# Patient Record
Sex: FEMALE | Race: WHITE | URBAN - METROPOLITAN AREA
[De-identification: names, ages, dates, MRNs, and addresses within clinical notes are randomized per-mention and may not be internally consistent; named-entity substitution may affect disease eponyms.]

---

## 2023-09-25 NOTE — PROGRESS NOTES
Assessment/Plan:  Problem List Items Addressed This Visit        Respiratory    Exercise-induced asthma     Stable. Patient will need pre and post spirometry in the future when COVID restrictions are lifted. AR (allergic rhinitis)     Stable on Allegra. Other    Overweight     Recommend lifestyle modifications. Relevant Orders    TSH, 3rd generation with Free T4 reflex    Dermoid cyst     Pending Gyn consult. Fibroids     Pending Gyn consult. Other Visit Diagnoses     Annual physical exam    -  Primary    Health Maintenance   Topic Date Due   • Hepatitis C Screening  Never done   • COVID-19 Vaccine (1) Never done   • HIV Screening  Never done   • DTaP,Tdap,and Td Vaccines (1 - Tdap) Never done   • Cervical Cancer Screening  Never done   • Breast Cancer Screening: Mammogram  Never done   • Influenza Vaccine (1) 09/01/2023   • Colorectal Cancer Screening  09/26/2024 (Originally 10/2/2012)   • Pneumococcal Vaccine: Pediatrics (0 to 5 Years) and At-Risk Patients (6 to 59 Years) (1 - PCV) 09/26/2024 (Originally 10/2/1971)   • Depression Screening  09/26/2024   • BMI: Followup Plan  09/26/2024   • BMI: Adult  09/26/2024   • Annual Physical  09/26/2024   • HIB Vaccine  Aged Out   • IPV Vaccine  Aged Out   • Hepatitis A Vaccine  Aged Out   • Meningococcal ACWY Vaccine  Aged Out   • HPV Vaccine  Aged Out         Dermatitis        Relevant Medications    doxycycline hyclate (VIBRA-TABS) 100 mg tablet    Other Relevant Orders    Lyme Total AB W Reflex to IGM/IGG    TICK BORNE DISEASE, ACUTE MOLECULAR PANEL    Tick-borne Disease, Acute Molecular Panel, Non-Lyme    Pending labs. Suspect Lyme disease. Start doxycycline 100 mg twice daily x10 days. Family hx of colon cancer        Patient declines colonoscopy and desires Cologuard though this is contraindicated.       Breast cancer screening by mammogram        Relevant Orders    Mammo screening bilateral w 3d & cad    Cervical cancer screening        Relevant Orders    Ambulatory Referral to Gynecology    Family history of colon cancer        Relevant Orders    Ambulatory Referral to Genetics    Patient declines colonoscopy and desires Cologuard though this is contraindicated. Family history of prostate cancer        Relevant Orders    Ambulatory Referral to Genetics    Family history of pancreatic cancer        Relevant Orders    Ambulatory Referral to Genetics    Subcutaneous nodule of left foot        Relevant Orders    Ambulatory Referral to Podiatry    Screening for diabetes mellitus        Relevant Orders    Hemoglobin A1C    Screening for cardiovascular condition        Relevant Orders    CBC and differential    Comprehensive metabolic panel    Lipid panel    LDL cholesterol, direct    Screening for HIV (human immunodeficiency virus)        Relevant Orders    HIV 1/2 AB/AG w Reflex [facility] for 2 yr old and above    Need for hepatitis C screening test        Relevant Orders    Hepatitis C antibody    BMI 27.0-27.9,adult        Insect bite of left forearm, initial encounter        Relevant Medications    doxycycline hyclate (VIBRA-TABS) 100 mg tablet    Other Relevant Orders    Lyme Total AB W Reflex to IGM/IGG    TICK BORNE DISEASE, ACUTE MOLECULAR PANEL    Tick-borne Disease, Acute Molecular Panel, Non-Lyme    Myalgia        Relevant Orders    Vitamin D 25 hydroxy           Return in about 6 weeks (around 11/7/2023) for F/U Asthma, Labs; Cancel appt bruce Julio 11/7/23. Future Appointments   Date Time Provider [address] [place] [address]   11/7/2023 10:40 AM Bry Arteaga DO [facility]        Subjective:     Laverne Young is a 56 y.o. female who presents today as a new patient for her medical conditions.       New Patient    Previous PCP:  [facility] in [place] in [place]   Reason for Transfer:  Patient moved  Last seen by previous PCP:  2013  Last Labs:  Never  Last Physical:  Unknown  Medical Records Requested: No      HPI:  Chief Complaint   Patient presents with   • Rash     Pt states that 3-4 weeks ago she noticed what she thought was a spider bite on the inside of the R elbow. Bite had two small marks and developed bumpy rash surrounding the bite. Bite resolved after three weeks. Then received another bite on the R forearm, same appearance as previous bite. Rash then began radiated up the arm and is now on b/l arms, neck, and chest. Rash is itchy and has some other itchy spots on the body, but there is no rash present. Has tried antihistamines with no relief. • New Patient Visit     Here to establish care and to address bites/rash. • Tick Bite     Pt states she is very outdoorsy and has had many tick bites over the years. Reports a tick bite on the L forearm at the same time as the first "spider bite." Bite resolved within a week. • HM     No prior HIV or Hep C screen. Ref for GI pending for CRC due to family hx of colon ca, pt would like to defer at this time, but discuss cologuard. Mammo pending. No GYN at this time, ref pending. Tdap was recently done, but pt does not remember where or when. Has had three doses of covid vaccine but does not have her card with her today. No prior shingrix, would like to discuss vaccine. -- Above per clinical staff and reviewed. --      HPI      Today:      Controlled Substance Review    [place] PDMP or [place]  reviewed: No red flags were identified; safe to proceed with prescription. .      Insect Bite on Medial R Elbow - Occurred 5-6 weeks ago, patient suspects spider bite as she had 2 small bite marks with a crater, bluish black blood in pit, and surrounding bumpy rash, which resolved after 3 weeks. 3-4  Weeks later, she sustained a 2nd insect bite on R forearm c similar appearance as first.  Rash has spread from Right forearm cephalad, to B/L arms, neck, and chest.  Rash is not present, but skin is itchy. Anterior neck rash is painful and itchy.   Using OTC Allergra 180mg QD s relief, OTC moisturizer on neck c benefit. No sick contacts. She spends a lot of tie outdoors, has been bitten by multiple ticks - had tick bite on L forearm 3-4 weeks ago at time of possible spider bite. She has never been tested for Lyme Disease, but may have had symptoms of fatigue. Denies ECM lesion. Overweight - Watching diet. +Exercise - Hiking, biking, or gardening, yard work for 5-6 hours, 7 days per week. AR - Stable on Allergra. Exercise induced Asthma - Not currently using Albuterol HFA. Dermoid Cyst / Fibroid Cysts - Had serial transvaginal U/S -overdue. H/O Pre-Skin Cancer - Management per Ari Toscano at Dr. Meghna Yañez office at [facility] in [facility], [address] Next appt 2/23. Reviewed:  Labs - None    No Gyn. Last Pap 2018. Denies H/O Abnormal Paps. No CRC previously. Sees Dentist q6 months. Sees Optho q1 year. Thinks she had Tdap 2021 - unsure of location. PHQ-2/9 Depression Screening    Little interest or pleasure in doing things: 0 - not at all  Feeling down, depressed, or hopeless: 0 - not at all  PHQ-2 Score: 0  PHQ-2 Interpretation: Negative depression screen           The following portions of the patient's history were reviewed and updated as appropriate: allergies, current medications, past family history, past medical history, past social history, past surgical history and problem list.      Review of Systems   Constitutional: Negative for appetite change, chills, diaphoresis, fatigue and fever. Respiratory: Negative for cough, chest tightness, shortness of breath and wheezing. Cardiovascular: Negative for chest pain. Gastrointestinal: Negative for abdominal pain, blood in stool, diarrhea, nausea and vomiting. Genitourinary: Negative for dysuria. Musculoskeletal: Positive for arthralgias (Chronic). Negative for myalgias. Skin: Positive for rash.         Current Outpatient Medications   Medication Sig Dispense Refill   • doxycycline hyclate (VIBRA-TABS) 100 mg tablet Take 1 tablet (100 mg total) by mouth 2 (two) times a day for 10 days 20 tablet 0   • fexofenadine (ALLEGRA) 180 MG tablet Take 180 mg by mouth daily       No current facility-administered medications for this visit. Objective:  /72   Pulse 56   Temp (!) 97.2 °F (36.2 °C)   Resp 14   Ht 5' 5" (1.651 m)   Wt 74.4 kg (164 lb)   LMP 01/01/2017 (Approximate)   SpO2 99%   Breastfeeding No   BMI 27.29 kg/m²    Wt Readings from Last 3 Encounters:   09/26/23 74.4 kg (164 lb)      BP Readings from Last 3 Encounters:   09/26/23 124/72          Physical Exam  Vitals and nursing note reviewed. Constitutional:       Appearance: Normal appearance. She is well-developed. HENT:      Head: Normocephalic and atraumatic. Right Ear: Tympanic membrane, ear canal and external ear normal.      Left Ear: Tympanic membrane, ear canal and external ear normal.      Nose: Nose normal.      Right Sinus: No maxillary sinus tenderness or frontal sinus tenderness. Left Sinus: No maxillary sinus tenderness or frontal sinus tenderness. Mouth/Throat:      Mouth: Mucous membranes are moist.      Pharynx: Oropharynx is clear. Uvula midline. Tonsils: No tonsillar exudate. Eyes:      Extraocular Movements: Extraocular movements intact. Conjunctiva/sclera: Conjunctivae normal.      Pupils: Pupils are equal, round, and reactive to light. Neck:      Vascular: No carotid bruit. Cardiovascular:      Rate and Rhythm: Normal rate and regular rhythm. Pulses: Normal pulses. Heart sounds: Normal heart sounds. Pulmonary:      Effort: Pulmonary effort is normal.      Breath sounds: Normal breath sounds. Abdominal:      General: Bowel sounds are normal. There is no distension. Palpations: Abdomen is soft. There is no mass. Tenderness: There is no abdominal tenderness. There is no guarding or rebound.    Musculoskeletal: General: No swelling or tenderness. Cervical back: Neck supple. Right lower leg: No edema. Left lower leg: No edema. Feet:      Comments: Left plantar foot longitudinal arch c 1cm x 1cm nontender nodule  Lymphadenopathy:      Cervical: No cervical adenopathy. Skin:     Findings: Rash (Anterior neck c 7cm x 3cm red, rough patch, Right anterior chest c fine, sandpaper rash, B/L UE c nonternder pink macule, Right forearm c  3mm x 3mm area of ecchymosis) present. Neurological:      General: No focal deficit present. Mental Status: She is alert and oriented to person, place, and time. Psychiatric:         Mood and Affect: Mood normal.         Behavior: Behavior normal.         Thought Content: Thought content normal.         Judgment: Judgment normal.         Lab Results:      No results found for: "WBC", "HGB", "HCT", "PLT", "CHOL", "TRIG", "HDL", "LDLDIRECT", "ALT", "AST", "NA", "K", "CL", "CREATININE", "BUN", "CO2", "TSH", "PSA", "INR", "GLUF", "HGBA1C", "[place]"  No results found for: "URICACID"  Invalid input(s): "BASENAME" Vitamin D    No results found. POCT Labs      BMI Counseling: Body mass index is 27.29 kg/m². The BMI is above normal. Nutrition recommendations include encouraging healthy choices of fruits and vegetables. Exercise recommendations include exercising 3-5 times per week. Rationale for BMI follow-up plan is due to patient being overweight or obese. Depression Screening and Follow-up Plan: Patient was screened for depression during today's encounter. They screened negative with a PHQ-2 score of 0.

## 2023-09-26 ENCOUNTER — OFFICE VISIT (OUTPATIENT)
Dept: FAMILY MEDICINE CLINIC | Facility: CLINIC | Age: 58
End: 2023-09-26
Payer: COMMERCIAL

## 2023-09-26 VITALS
BODY MASS INDEX: 27.32 KG/M2 | HEIGHT: 65 IN | SYSTOLIC BLOOD PRESSURE: 124 MMHG | WEIGHT: 164 LBS | OXYGEN SATURATION: 99 % | TEMPERATURE: 97.2 F | RESPIRATION RATE: 14 BRPM | DIASTOLIC BLOOD PRESSURE: 72 MMHG | HEART RATE: 56 BPM
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
## 2023-09-26 DIAGNOSIS — R22.42 SUBCUTANEOUS NODULE OF LEFT FOOT: ICD-10-CM

## 2023-09-26 DIAGNOSIS — Z80.0 FAMILY HISTORY OF PANCREATIC CANCER: ICD-10-CM

## 2023-09-26 DIAGNOSIS — Z00.00 ANNUAL PHYSICAL EXAM: Primary | ICD-10-CM

## 2023-09-26 DIAGNOSIS — Z80.0 FAMILY HX OF COLON CANCER: ICD-10-CM

## 2023-09-26 DIAGNOSIS — Z13.1 SCREENING FOR DIABETES MELLITUS: ICD-10-CM

## 2023-09-26 DIAGNOSIS — S50.862A INSECT BITE OF LEFT FOREARM, INITIAL ENCOUNTER: ICD-10-CM

## 2023-09-26 DIAGNOSIS — Z80.0 FAMILY HISTORY OF COLON CANCER: ICD-10-CM

## 2023-09-26 DIAGNOSIS — Z11.59 NEED FOR HEPATITIS C SCREENING TEST: ICD-10-CM

## 2023-09-26 DIAGNOSIS — D36.9 DERMOID CYST: ICD-10-CM

## 2023-09-26 DIAGNOSIS — L30.9 DERMATITIS: ICD-10-CM

## 2023-09-26 DIAGNOSIS — Z80.42 FAMILY HISTORY OF PROSTATE CANCER: ICD-10-CM

## 2023-09-26 DIAGNOSIS — Z12.31 BREAST CANCER SCREENING BY MAMMOGRAM: ICD-10-CM

## 2023-09-26 DIAGNOSIS — Z11.4 SCREENING FOR HIV (HUMAN IMMUNODEFICIENCY VIRUS): ICD-10-CM

## 2023-09-26 DIAGNOSIS — J30.1 NON-SEASONAL ALLERGIC RHINITIS DUE TO POLLEN: ICD-10-CM

## 2023-09-26 DIAGNOSIS — M79.10 MYALGIA: ICD-10-CM

## 2023-09-26 DIAGNOSIS — J45.990 EXERCISE-INDUCED ASTHMA: ICD-10-CM

## 2023-09-26 DIAGNOSIS — Z13.6 SCREENING FOR CARDIOVASCULAR CONDITION: ICD-10-CM

## 2023-09-26 DIAGNOSIS — Z12.4 CERVICAL CANCER SCREENING: ICD-10-CM

## 2023-09-26 DIAGNOSIS — D21.9 FIBROIDS: ICD-10-CM

## 2023-09-26 DIAGNOSIS — E66.3 OVERWEIGHT: ICD-10-CM

## 2023-09-26 PROBLEM — J30.9 AR (ALLERGIC RHINITIS): Status: ACTIVE | Noted: 2023-09-26

## 2023-09-26 PROCEDURE — 99386 PREV VISIT NEW AGE 40-64: CPT | Performed by: FAMILY MEDICINE

## 2023-09-26 PROCEDURE — 99204 OFFICE O/P NEW MOD 45 MIN: CPT | Performed by: FAMILY MEDICINE

## 2023-09-26 RX ORDER — DOXYCYCLINE HYCLATE 100 MG
100 TABLET ORAL 2 TIMES DAILY
Qty: 20 TABLET | Refills: 0 | Status: SHIPPED | OUTPATIENT
Start: 2023-09-26 | End: 2023-10-06

## 2023-09-26 RX ORDER — FEXOFENADINE HCL 180 MG/1
180 TABLET ORAL DAILY
COMMUNITY

## 2023-09-26 NOTE — PATIENT INSTRUCTIONS
Please contact your insurance if you are uncertain of coverage for plan of care items. Your insurance may not cover the cost of your Vitamin D blood test, which is approximately $65-70. Please notify the lab prior to blood draw if you would like to decline this test.      Start Pepcid 20mg twice daily as needed to itching. Wellness Visit for Adults   AMBULATORY CARE:   A wellness visit  is when you see your healthcare provider to get screened for health problems. Your healthcare provider will also give you advice on how to stay healthy. Write down your questions so you remember to ask them. Ask your healthcare provider how often you should have a wellness visit. What happens at a wellness visit:  Your healthcare provider will ask about your health, and your family history of health problems. This includes high blood pressure, heart disease, and cancer. He or she will ask if you have symptoms that concern you, if you smoke, and about your mood. You may also be asked about your intake of medicines, supplements, food, and alcohol. Any of the following may be done: Your weight  will be checked. Your height may also be checked so your body mass index (BMI) can be calculated. Your BMI shows if you are at a healthy weight. Your blood pressure  and heart rate will be checked. Your temperature may also be checked. Blood and urine tests  may be done. Blood tests may be done to check your cholesterol levels. Abnormal cholesterol levels increase your risk for heart disease and stroke. You may also need a blood or urine test to check for diabetes if you are at increased risk. Urine tests may be done to look for signs of an infection or kidney disease. A physical exam  includes checking your heartbeat and lungs with a stethoscope. Your healthcare provider may also check your skin to look for sun damage. Screening tests  may be recommended.  A screening test is done to check for diseases that may not cause symptoms. The screening tests you may need depend on your age, gender, family history, and lifestyle habits. For example, colorectal screening may be recommended if you are 48years old or older. Screening tests you need if you are a woman:   A Pap smear  is used to screen for cervical cancer. Pap smears are usually done every 3 to 5 years depending on your age. You may need them more often if you have had abnormal Pap smear test results in the past. Ask your healthcare provider how often you should have a Pap smear. A mammogram  is an x-ray of your breasts to screen for breast cancer. Experts recommend mammograms every 2 years starting at age 48 years. You may need a mammogram at age 52 years or younger if you have an increased risk for breast cancer. Talk to your healthcare provider about when you should start having mammograms and how often you need them. Vaccines you may need:   Get an influenza vaccine  every year. The influenza vaccine protects you from the flu. Several types of viruses cause the flu. The viruses change over time, so new vaccines are made each year. Get a tetanus-diphtheria (Td) booster vaccine  every 10 years. This vaccine protects you against tetanus and diphtheria. Tetanus is a severe infection that may cause painful muscle spasms and lockjaw. Diphtheria is a severe bacterial infection that causes a thick covering in the back of your mouth and throat. Get a human papillomavirus (HPV) vaccine  if you are female and aged 23 to 32 or male 23 to 24 and never received it. This vaccine protects you from HPV infection. HPV is the most common infection spread by sexual contact. HPV may also cause vaginal, penile, and anal cancers. Get a pneumococcal vaccine  if you are aged 72 years or older. The pneumococcal vaccine is an injection given to protect you from pneumococcal disease. Pneumococcal disease is an infection caused by pneumococcal bacteria.  The infection may cause pneumonia, meningitis, or an ear infection. Get a shingles vaccine  if you are 60 or older, even if you have had shingles before. The shingles vaccine is an injection to protect you from the varicella-zoster virus. This is the same virus that causes chickenpox. Shingles is a painful rash that develops in people who had chickenpox or have been exposed to the virus. How to eat healthy:  My Plate is a model for planning healthy meals. It shows the types and amounts of foods that should go on your plate. Fruits and vegetables make up about half of your plate, and grains and protein make up the other half. A serving of dairy is included on the side of your plate. The amount of calories and serving sizes you need depends on your age, gender, weight, and height. Examples of healthy foods are listed below:  Eat a variety of vegetables  such as dark green, red, and orange vegetables. You can also include canned vegetables low in sodium (salt) and frozen vegetables without added butter or sauces. Eat a variety of fresh fruits , canned fruit in 100% juice, frozen fruit, and dried fruit. Include whole grains. At least half of the grains you eat should be whole grains. Examples include whole-wheat bread, wheat pasta, brown rice, and whole-grain cereals such as oatmeal.    Eat a variety of protein foods such as seafood (fish and shellfish), lean meat, and poultry without skin (turkey and chicken). Examples of lean meats include pork leg, shoulder, or tenderloin, and beef round, sirloin, tenderloin, and extra lean ground beef. Other protein foods include eggs and egg substitutes, beans, peas, soy products, nuts, and seeds. Choose low-fat dairy products such as skim or 1% milk or low-fat yogurt, cheese, and cottage cheese. Limit unhealthy fats  such as butter, hard margarine, and shortening. Exercise:  Exercise at least 30 minutes per day on most days of the week.  Some examples of exercise include walking, biking, dancing, and swimming. You can also fit in more physical activity by taking the stairs instead of the elevator or parking farther away from stores. Include muscle strengthening activities 2 days each week. Regular exercise provides many health benefits. It helps you manage your weight, and decreases your risk for type 2 diabetes, heart disease, stroke, and high blood pressure. Exercise can also help improve your mood. Ask your healthcare provider about the best exercise plan for you. General health and safety guidelines:   Do not smoke. Nicotine and other chemicals in cigarettes and cigars can cause lung damage. Ask your healthcare provider for information if you currently smoke and need help to quit. E-cigarettes or smokeless tobacco still contain nicotine. Talk to your healthcare provider before you use these products. Limit alcohol. A drink of alcohol is 12 ounces of beer, 5 ounces of wine, or 1½ ounces of liquor. Lose weight, if needed. Being overweight increases your risk of certain health conditions. These include heart disease, high blood pressure, type 2 diabetes, and certain types of cancer. Protect your skin. Do not sunbathe or use tanning beds. Use sunscreen with a SPF 15 or higher. Apply sunscreen at least 15 minutes before you go outside. Reapply sunscreen every 2 hours. Wear protective clothing, hats, and sunglasses when you are outside. Drive safely. Always wear your seatbelt. Make sure everyone in your car wears a seatbelt. A seatbelt can save your life if you are in an accident. Do not use your cell phone when you are driving. This could distract you and cause an accident. Pull over if you need to make a call or send a text message. Practice safe sex. Use latex condoms if are sexually active and have more than one partner. Your healthcare provider may recommend screening tests for sexually transmitted infections (STIs).     Wear helmets, lifejackets, and protective gear.  Always wear a helmet when you ride a bike or motorcycle, go skiing, or play sports that could cause a head injury. Wear protective equipment when you play sports. Wear a lifejacket when you are on a boat or doing water sports. © Copyright [facility] 2023 Information is for End User's use only and may not be sold, redistributed or otherwise used for commercial purposes. The above information is an  only. It is not intended as medical advice for individual conditions or treatments. Talk to your doctor, nurse or pharmacist before following any medical regimen to see if it is safe and effective for you. Weight Management   AMBULATORY CARE:   Why it is important to manage your weight:  Being overweight increases your risk of health conditions such as heart disease, high blood pressure, type 2 diabetes, and certain types of cancer. It can also increase your risk for osteoarthritis, sleep apnea, and other respiratory problems. Aim for a slow, steady weight loss. Even a small amount of weight loss can lower your risk of health problems. Risks of being overweight:  Extra weight can cause many health problems, including the following:  Diabetes (high blood sugar level)    High blood pressure or high cholesterol    Heart disease    Stroke    Gallbladder or liver disease    Cancer of the colon, breast, prostate, liver, or kidney    Sleep apnea    Arthritis or gout    Screening  is done to check for health conditions before you have signs or symptoms. If you are 28to 79years old, your blood sugar level may be checked every 3 years for signs of prediabetes or diabetes. Your healthcare provider will check your blood pressure at each visit. High blood pressure can lead to a stroke or other problems. Your provider may check for signs of heart disease, cancer, or other health problems.   How to lose weight safely:  A safe and healthy way to lose weight is to eat fewer calories and get regular exercise. You can lose up about 1 pound a week by decreasing the number of calories you eat by 500 calories each day. You can decrease calories by eating smaller portion sizes or by cutting out high-calorie foods. Read labels to find out how many calories are in the foods you eat. You can also burn calories with exercise such as walking, swimming, or biking. You will be more likely to keep weight off if you make these changes part of your lifestyle. Exercise at least 30 minutes per day on most days of the week. You can also fit in more physical activity by taking the stairs instead of the elevator or parking farther away from stores. Ask your healthcare provider about the best exercise plan for you. Healthy meal plan for weight management:  A healthy meal plan includes a variety of foods, contains fewer calories, and helps you stay healthy. A healthy meal plan includes the following:     Eat whole-grain foods more often. A healthy meal plan should contain fiber. Fiber is the part of grains, fruits, and vegetables that is not broken down by your body. Whole-grain foods are healthy and provide extra fiber in your diet. Some examples of whole-grain foods are whole-wheat breads and pastas, oatmeal, brown rice, and bulgur. Eat a variety of vegetables every day. Include dark, leafy greens such as spinach, kale, steve greens, and mustard greens. Eat yellow and orange vegetables such as carrots, sweet potatoes, and winter squash. Eat a variety of fruits every day. Choose fresh or canned fruit (canned in its own juice or light syrup) instead of juice. Fruit juice has very little or no fiber. Eat low-fat dairy foods. Drink fat-free (skim) milk or 1% milk. Eat fat-free yogurt and low-fat cottage cheese. Try low-fat cheeses such as mozzarella and other reduced-fat cheeses. Choose meat and other protein foods that are low in fat. Choose beans or other legumes such as split peas or lentils. Choose fish, skinless poultry (chicken or turkey), or lean cuts of red meat (beef or pork). Before you cook meat or poultry, cut off any visible fat. Use less fat and oil. Try baking foods instead of frying them. Add less fat, such as margarine, sour cream, regular salad dressing and mayonnaise to foods. Eat fewer high-fat foods. Some examples of high-fat foods include french fries, doughnuts, ice cream, and cakes. Eat fewer sweets. Limit foods and drinks that are high in sugar. This includes candy, cookies, regular soda, and sweetened drinks. Ways to decrease calories:   Eat smaller portions. Use a small plate with smaller servings. Do not eat second helpings. When you eat at a restaurant, ask for a box and place half of your meal in the box before you eat. Share an entrée with someone else. Replace high-calorie snacks with healthy, low-calorie snacks. Choose fresh fruit, vegetables, fat-free rice cakes, or air-popped popcorn instead of potato chips, nuts, or chocolate. Choose water or calorie-free drinks instead of soda or sweetened drinks. Do not shop for groceries when you are hungry. You may be more likely to make unhealthy food choices. Take a grocery list of healthy foods and shop after you have eaten. Eat regular meals. Do not skip meals. Skipping meals can lead to overeating later in the day. This can make it harder for you to lose weight. Eat a healthy snack in place of a meal if you do not have time to eat a regular meal. Talk with a dietitian to help you create a meal plan and schedule that is right for you. Other things to consider as you try to lose weight:   Be aware of situations that may give you the urge to overeat, such as eating while watching television. Find ways to avoid these situations. For example, read a book, go for a walk, or do crafts. Meet with a weight loss support group or friends who are also trying to lose weight.  This may help you stay motivated to continue working on your weight loss goals. © Copyright Dorothea Choudhary 2023 Information is for End User's use only and may not be sold, redistributed or otherwise used for commercial purposes. The above information is an  only. It is not intended as medical advice for individual conditions or treatments. Talk to your doctor, nurse or pharmacist before following any medical regimen to see if it is safe and effective for you. Cholesterol and Your Health   AMBULATORY CARE:   Cholesterol  is a waxy, fat-like substance. Your body uses cholesterol to make hormones and new cells, and to protect nerves. Cholesterol is made by your body. It also comes from certain foods you eat, such as meat and dairy products. Your healthcare provider can help you set goals for your cholesterol levels. Your provider can help you create a plan to meet your goals. Cholesterol level goals: Your cholesterol level goals depend on your risk for heart disease, your age, and your other health conditions. The following are general guidelines: Total cholesterol  includes low-density lipoprotein (LDL), high-density lipoprotein (HDL), and triglyceride levels. The total cholesterol level should be lower than 200 mg/dL and is best at about 150 mg/dL. LDL cholesterol  is called bad cholesterol  because it forms plaque in your arteries. As plaque builds up, your arteries become narrow, and less blood flows through. When plaque decreases blood flow to your heart, you may have chest pain. If plaque completely blocks an artery that brings blood to your heart, you may have a heart attack. Plaque can break off and form blood clots. Blood clots may block arteries in your brain and cause a stroke. The level should be less than 130 mg/dL and is best at about 100 mg/dL. HDL cholesterol  is called good cholesterol  because it helps remove LDL cholesterol from your arteries.  It does this by attaching to LDL cholesterol and carrying it to your liver. Your liver breaks down LDL cholesterol so your body can get rid of it. High levels of HDL cholesterol can help prevent a heart attack and stroke. Low levels of HDL cholesterol can increase your risk for heart disease, heart attack, and stroke. The level should be at least 40 mg/dL in males or at least 50 mg/dL in females. Triglycerides  are a type of fat that store energy from foods you eat. High levels of triglycerides also cause plaque buildup. This can increase your risk for a heart attack or stroke. If your triglyceride level is high, your LDL cholesterol level may also be high. The level should be less than 150 mg/dL. Any of the following can increase your risk for high cholesterol:   Smoking or drinking large amounts of alcohol    Having overweight or obesity, or not getting enough exercise    A medical condition such as hypertension (high blood pressure) or diabetes    A family history of high cholesterol    Age older than 72    What you need to know about having your cholesterol levels checked: Adults 21to 39years of age should have their cholesterol levels checked every 4 to 6 years. Adults 45 years or older should have their cholesterol checked every 1 to 2 years. You may need your cholesterol checked more often, or at a younger age, if you have risk factors for heart disease. You may also need to have your cholesterol checked more often if you have other health conditions, such as diabetes. Blood tests are used to check cholesterol levels. Blood tests measure your levels of triglycerides, LDL cholesterol, and HDL cholesterol. How healthy fats affect your cholesterol levels:  Healthy fats, also called unsaturated fats, help lower LDL cholesterol and triglyceride levels. Healthy fats include the following:  Monounsaturated fats  are found in foods such as olive oil, canola oil, avocado, nuts, and olives.     Polyunsaturated fats,  such as omega 3 fats, are found in fish, such as salmon, trout, and tuna. They can also be found in plant foods such as flaxseed, walnuts, and soybeans. How unhealthy fats affect your cholesterol levels:  Unhealthy fats increase LDL cholesterol and triglyceride levels. They are found in foods high in cholesterol, saturated fat, and trans fat:  Cholesterol  is found in eggs, dairy, and meat. Saturated fat  is found in butter, cheese, ice cream, whole milk, and coconut oil. Saturated fat is also found in meat, such as sausage, hot dogs, and bologna. Trans fat  is found in liquid oils and is used in fried and baked foods. Foods that contain trans fats include chips, crackers, muffins, sweet rolls, microwave popcorn, and cookies. Treatment  for high cholesterol will also decrease your risk of heart disease, heart attack, and stroke. Treatment may include any of the following:  Lifestyle changes  may include food, exercise, weight loss, and quitting smoking. You may also need to decrease the amount of alcohol you drink. Your healthcare provider will want you to start with lifestyle changes. Other treatment may be added if lifestyle changes are not enough. Your healthcare provider may recommend you work with a team to manage hyperlipidemia. The team may include medical experts such as a dietitian, an exercise or physical therapist, and a behavior therapist. Your family members may be included in helping you create lifestyle changes. Medicines  may be given to lower your LDL cholesterol, triglyceride levels, or total cholesterol level. You may need medicines to lower your cholesterol if any of the following is true:    You have a history of stroke, TIA, unstable angina, or a heart attack. Your LDL cholesterol level is 190 mg/dL or higher. You are age 36 to 76 years, have diabetes or heart disease risk factors, and your LDL cholesterol is 70 mg/dL or higher. Supplements  include fish oil, red yeast rice, and garlic.  Fish oil may help lower your triglyceride and LDL cholesterol levels. It may also increase your HDL cholesterol level. Red yeast rice may help decrease your total cholesterol level and LDL cholesterol level. Garlic may help lower your total cholesterol level. Do not take any supplements without talking to your healthcare provider. Food changes you can make to lower your cholesterol levels:  A dietitian can help you create a healthy eating plan. Your dietitian can show you how to read food labels and choose foods low in saturated fat, trans fats, and cholesterol. Decrease the total amount of fat you eat. Choose lean meats, fat-free or 1% fat milk, and low-fat dairy products, such as yogurt and cheese. Try to limit or avoid red meats. Limit or do not eat fried foods or baked goods, such as cookies. Replace unhealthy fats with healthy fats. Cook foods in olive oil or canola oil. Choose soft margarines that are low in saturated fat and trans fat. Seeds, nuts, and avocados are other examples of healthy fats. Eat foods with omega-3 fats. Examples include salmon, tuna, mackerel, walnuts, and flaxseed. Eat fish 2 times per week. Pregnant women should not eat fish that have high levels of mercury, such as shark, swordfish, and kaveh mackerel. Increase the amount of high-fiber foods you eat. High-fiber foods can help lower your LDL cholesterol. Aim to get between 20 and 30 grams of fiber each day. Fruits and vegetables are high in fiber. Eat at least 5 servings each day. Other high-fiber foods are whole-grain or whole-wheat breads, pastas, or cereals, and brown rice. Eat 3 ounces of whole-grain foods each day. Increase fiber slowly. You may have abdominal discomfort, bloating, and gas if you add fiber to your diet too quickly. Eat healthy protein foods. Examples include low-fat dairy products, skinless chicken and turkey, fish, and nuts. Limit foods and drinks that are high in sugar.   Your dietitian or healthcare provider can help you create daily limits for high-sugar foods and drinks. The limit may be lower if you have diabetes or another health condition. Limits can also help you lose weight if needed. Lifestyle changes you can make to lower your cholesterol levels:   Maintain a healthy weight. Ask your healthcare provider what a healthy weight is for you. Ask your provider to help you create a weight loss plan if needed. Weight loss can decrease your total cholesterol and triglyceride levels. Weight loss may also help keep your blood pressure at a healthy level. Be physically active throughout the day. Physical activity, such as exercise, can help lower your total cholesterol level and maintain a healthy weight. Physical activity can also help increase your HDL cholesterol level. Work with your healthcare provider to create an program that is right for you. Get at least 30 to 40 minutes of moderate physical activity most days of the week. Examples of exercise include brisk walking, swimming, or biking. Also include strength training at least 2 times each week. Your healthcare providers can help you create a physical activity plan. Do not smoke. Nicotine and other chemicals in cigarettes and cigars can raise your cholesterol levels. Ask your healthcare provider for information if you currently smoke and need help to quit. E-cigarettes or smokeless tobacco still contain nicotine. Talk to your healthcare provider before you use these products. Limit or do not drink alcohol. Alcohol can increase your triglyceride levels. Ask your healthcare provider before you drink alcohol. Ask how much is okay for you to drink in 24 hours or 1 week. Follow up with your doctor as directed:  Write down your questions so you remember to ask them during your visits. © Copyright [employer] 2023 Information is for End User's use only and may not be sold, redistributed or otherwise used for commercial purposes.   The above information is an  only. It is not intended as medical advice for individual conditions or treatments. Talk to your doctor, nurse or pharmacist before following any medical regimen to see if it is safe and effective for you.

## 2023-09-28 ENCOUNTER — TELEPHONE (OUTPATIENT)
Dept: HEMATOLOGY ONCOLOGY | Facility: CLINIC | Age: 58
End: 2023-09-28

## 2023-09-28 NOTE — TELEPHONE ENCOUNTER
I called Ivis Gonzalez in response to a referral that was received for patient to establish care with [facility]. Outreach was made to schedule a consultation. patient states she will think about this referral and call us back in the future. The referral has been closed.

## 2023-09-29 PROBLEM — E78.5 HYPERLIPIDEMIA: Status: ACTIVE | Noted: 2023-09-29

## 2023-09-29 PROBLEM — R73.01 IFG (IMPAIRED FASTING GLUCOSE): Status: ACTIVE | Noted: 2023-09-29

## 2023-09-29 NOTE — RESULT ENCOUNTER NOTE
Unstable labs - will review with patient at upcoming appointment. Was pt fasting for labs? Hyperlipidemia - Needs statin for direct . The ASCVD Risk score (Mark COLE, et al., 2019) failed to calculate for the following reasons: The valid total cholesterol range is 130 to 320 mg/dL    IFG - New. To consider Metformin XR? Negative Lyme disease testing. Continue plan of care.

## 2023-09-29 NOTE — RESULT ENCOUNTER NOTE
Stable labs - will review with patient at upcoming appointment. Negative Ehrlichia (tick test). Continue plan of care.

## 2023-09-30 LAB
25(OH)D3 SERPL-MCNC: 31 NG/ML (ref 30–100)
ALBUMIN SERPL-MCNC: 4.5 G/DL (ref 3.6–5.1)
ALBUMIN/GLOB SERPL: 1.9 (CALC) (ref 1–2.5)
ALP SERPL-CCNC: 71 U/L (ref 37–153)
ALT SERPL-CCNC: 22 U/L (ref 6–29)
AST SERPL-CCNC: 17 U/L (ref 10–35)
B BURGDOR AB SER IA-ACNC: <0.9 INDEX
BASOPHILS # BLD AUTO: 39 CELLS/UL (ref 0–200)
BASOPHILS NFR BLD AUTO: 0.7 %
BILIRUB SERPL-MCNC: 0.5 MG/DL (ref 0.2–1.2)
BORRELIA DNA BLD QL NAA+PROBE: NOT DETECTED
BUN SERPL-MCNC: 12 MG/DL (ref 7–25)
BUN/CREAT SERPL: ABNORMAL (CALC) (ref 6–22)
CALCIUM SERPL-MCNC: 9.7 MG/DL (ref 8.6–10.4)
CHLORIDE SERPL-SCNC: 103 MMOL/L (ref 98–110)
CHOLEST SERPL-MCNC: 322 MG/DL
CHOLEST/HDLC SERPL: 4.1 (CALC)
CO2 SERPL-SCNC: 29 MMOL/L (ref 20–32)
CREAT SERPL-MCNC: 0.65 MG/DL (ref 0.5–1.03)
E CHAFFEENSIS DNA BLD QL NAA+PROBE: NOT DETECTED
EOSINOPHIL # BLD AUTO: 162 CELLS/UL (ref 15–500)
EOSINOPHIL NFR BLD AUTO: 2.9 %
ERYTHROCYTE [DISTWIDTH] IN BLOOD BY AUTOMATED COUNT: 13 % (ref 11–15)
GFR/BSA.PRED SERPLBLD CYS-BASED-ARV: 104 ML/MIN/1.73M2
GLOBULIN SER CALC-MCNC: 2.4 G/DL (CALC) (ref 1.9–3.7)
GLUCOSE SERPL-MCNC: 107 MG/DL (ref 65–99)
HBA1C MFR BLD: 5.7 % OF TOTAL HGB
HCT VFR BLD AUTO: 40.4 % (ref 35–45)
HCV AB SERPL QL IA: NORMAL
HDLC SERPL-MCNC: 78 MG/DL
HGB BLD-MCNC: 14.2 G/DL (ref 11.7–15.5)
HIV 1+2 AB+HIV1 P24 AG SERPL QL IA: NORMAL
LDLC SERPL CALC-MCNC: 220 MG/DL (CALC)
LDLC SERPL DIRECT ASSAY-MCNC: 235 MG/DL
LYMPHOCYTES # BLD AUTO: 2761 CELLS/UL (ref 850–3900)
LYMPHOCYTES NFR BLD AUTO: 49.3 %
MCH RBC QN AUTO: 33.9 PG (ref 27–33)
MCHC RBC AUTO-ENTMCNC: 35.1 G/DL (ref 32–36)
MCV RBC AUTO: 96.4 FL (ref 80–100)
MONOCYTES # BLD AUTO: 510 CELLS/UL (ref 200–950)
MONOCYTES NFR BLD AUTO: 9.1 %
NEUTROPHILS # BLD AUTO: 2128 CELLS/UL (ref 1500–7800)
NEUTROPHILS NFR BLD AUTO: 38 %
NONHDLC SERPL-MCNC: 244 MG/DL (CALC)
PLATELET # BLD AUTO: 207 THOUSAND/UL (ref 140–400)
PMV BLD REES-ECKER: 12 FL (ref 7.5–12.5)
POTASSIUM SERPL-SCNC: 4.8 MMOL/L (ref 3.5–5.3)
PROT SERPL-MCNC: 6.9 G/DL (ref 6.1–8.1)
RBC # BLD AUTO: 4.19 MILLION/UL (ref 3.8–5.1)
SODIUM SERPL-SCNC: 138 MMOL/L (ref 135–146)
TRIGL SERPL-MCNC: 110 MG/DL
TSH SERPL-ACNC: 3.27 MIU/L
WBC # BLD AUTO: 5.6 THOUSAND/UL (ref 3.8–10.8)

## 2023-10-02 NOTE — RESULT ENCOUNTER NOTE
Normal Borrelia tick test.  Continue plan of care.       Message sent to patient via [facility] patient portal.

## 2023-10-25 DIAGNOSIS — Z12.31 BREAST CANCER SCREENING BY MAMMOGRAM: ICD-10-CM

## 2023-10-27 ENCOUNTER — TELEPHONE (OUTPATIENT)
Dept: HEMATOLOGY ONCOLOGY | Facility: CLINIC | Age: 58
End: 2023-10-27

## 2023-10-27 NOTE — TELEPHONE ENCOUNTER
I spoke with Justo Flores to schedule their consultation with Cancer Risk and Genetics. Scheduling Outcome: Patient is scheduled for an appointment on 3/7/23 at 10am with Katina Vila    Personal/Family History Related to Appointment:     Personal History of Cancer: Patient reports no personal history of cancer    Family History of Cancer: Patient reports family history of father-prostate;paternal grandfather-pancreatic; maternal grandmother-colon; Is patient of [address] [place] [address] [address]?: No    History of Genetic Testing:  Personal History of Genetic Testing: Patient report no personal history of Genetic Testing. Family History of Genetic Testing: Patient reports that no family members have had Genetic Testing. Progeny:  Is patient able to complete our family history questionnaire on a computer?:  Yes    Patient's preferred e-mail address: [email or web address]

## 2023-11-07 ENCOUNTER — OFFICE VISIT (OUTPATIENT)
Dept: FAMILY MEDICINE CLINIC | Facility: CLINIC | Age: 58
End: 2023-11-07
Payer: COMMERCIAL

## 2023-11-07 VITALS
TEMPERATURE: 97.9 F | RESPIRATION RATE: 16 BRPM | SYSTOLIC BLOOD PRESSURE: 128 MMHG | OXYGEN SATURATION: 99 % | WEIGHT: 147 LBS | DIASTOLIC BLOOD PRESSURE: 76 MMHG | BODY MASS INDEX: 24.49 KG/M2 | HEART RATE: 62 BPM | HEIGHT: 65 IN

## 2023-11-07 DIAGNOSIS — E78.5 HYPERLIPIDEMIA, UNSPECIFIED HYPERLIPIDEMIA TYPE: ICD-10-CM

## 2023-11-07 DIAGNOSIS — L30.9 DERMATITIS: ICD-10-CM

## 2023-11-07 DIAGNOSIS — R73.01 IFG (IMPAIRED FASTING GLUCOSE): Primary | ICD-10-CM

## 2023-11-07 DIAGNOSIS — D36.9 DERMOID CYST: ICD-10-CM

## 2023-11-07 DIAGNOSIS — D21.9 FIBROIDS: ICD-10-CM

## 2023-11-07 DIAGNOSIS — R71.8 HIGH MEAN CORPUSCULAR HEMOGLOBIN CONCENTRATION (MCHC): ICD-10-CM

## 2023-11-07 DIAGNOSIS — Z80.0 FAMILY HX OF COLON CANCER: ICD-10-CM

## 2023-11-07 DIAGNOSIS — J30.1 NON-SEASONAL ALLERGIC RHINITIS DUE TO POLLEN: ICD-10-CM

## 2023-11-07 DIAGNOSIS — Z12.11 COLON CANCER SCREENING: ICD-10-CM

## 2023-11-07 PROBLEM — E66.3 OVERWEIGHT: Status: RESOLVED | Noted: 2023-09-26 | Resolved: 2023-11-07

## 2023-11-07 PROCEDURE — 99214 OFFICE O/P EST MOD 30 MIN: CPT | Performed by: FAMILY MEDICINE

## 2023-11-07 RX ORDER — MOMETASONE FUROATE 1 MG/G
CREAM TOPICAL 2 TIMES DAILY PRN
Qty: 15 G | Refills: 0 | Status: SHIPPED | OUTPATIENT
Start: 2023-11-07

## 2023-11-07 NOTE — PATIENT INSTRUCTIONS
Low normal vitamin D - Recommend start multivitamin and over-the-counter vitamin D3 1000 - 3000 International Units daily. Prediabetes   AMBULATORY CARE:   Prediabetes  is a blood glucose (sugar) level that is higher than normal. It is not high enough to be considered diabetes. Prediabetes increases your risk for type 2 diabetes and heart disease. The risk is highest if you have high blood pressure or high cholesterol. The following increase your risk for prediabetes:   Excess body weight or obesity    Lack of physical activity    Older age    Family history of diabetes (parent or sibling)    A history of heart disease, gestational diabetes, or polycystic ovary syndrome (PCOS)    High blood pressure or cholesterol levels    Being [place] American, , American [place], [place] American, or     In children, having a mother with diabetes or gestational diabetes mellitus (GDM) during the pregnancy    Signs and symptoms:  Prediabetes may not cause any symptoms. Call your doctor if:   You have more hunger or thirst than usual.    You are urinating more often than usual.    You are always exhausted. You have blurred vision. You have questions or concerns about your condition or care. Prevent or delay type 2 diabetes:  Healthy choices work best to delay or prevent type 2 diabetes. You may be given the following guidelines from your healthcare provider:  Get regular physical activity. Adults should get at least 150 minutes (2.5 hours) of moderate physical activity every week. Spread the amount of activity over at least 3 days a week. Do not skip more than 2 days in a row. Children should get at least 60 minutes of moderate physical activity on most days of the week. Examples of moderate physical activity include brisk walking, running, and swimming. Do not sit for longer than 30 minutes at a time. Work with your healthcare provider to create a plan for physical activity.          Maintain a healthy body weight. Your healthcare provider can tell you what a healthy weight is for you. Your provider can help you create a weight-loss plan, if needed. Even a weight loss of 3% to 7% of excess body weight can be helpful. Eat a variety of healthy foods. Examples include vegetables, fruit, whole-grains, low-fat dairy, healthy fats, fish, and lean meat or protein foods. Eat fewer sweets, such as candy, cookies, regular soda, and sweetened drinks. You can also decrease calories by eating smaller portion sizes. Work with your healthcare provider or dietitian to develop a meal plan that is right for you. Take medicine as directed. Your healthcare provider may give diabetes medicine if you are at high risk for diabetes. You may also need medicines for high blood pressure or high cholesterol. Do not smoke. Do not use e-cigarettes or smokeless tobacco in place of cigarettes or to help you quit. They still contain nicotine. Ask your healthcare provider for information if you currently smoke and need help quitting. Start with small goals and work your way up. For example, a goal may be to get 3 days of physical activity each week. You can add a day after 3 weeks or so of activity. A goal may also be safe and steady weight loss. Examples are losing 1 to 2 pounds each week or focusing on losing 5 pounds at a time. Follow up with your doctor as directed: You will need to return every year to get tested for diabetes, if you have prediabetes. Your provider may also recommend counseling to help you make food or physical activity changes. Write down your questions so you remember to ask them during your visits. © Copyright [employer] 2023 Information is for End User's use only and may not be sold, redistributed or otherwise used for commercial purposes. The above information is an  only. It is not intended as medical advice for individual conditions or treatments.  Talk to your doctor, nurse or pharmacist before following any medical regimen to see if it is safe and effective for you.

## 2023-11-07 NOTE — PROGRESS NOTES
Assessment/Plan:  Problem List Items Addressed This Visit          Endocrine    IFG (impaired fasting glucose) - Primary     Patient declines starting metformin  mg 3 pills daily and prediabetic education. Recommend lifestyle modifications. Relevant Orders    Comprehensive metabolic panel    Hemoglobin A1C       Respiratory    AR (allergic rhinitis)     Stable on Allegra. Other    Hyperlipidemia     Uncontrolled. Patient declines starting statin despite LDL greater than 190. Recommend lifestyle modifications. The ASCVD Risk score (Mark COLE, et al., 2019) failed to calculate for the following reasons: The valid total cholesterol range is 130 to 320 mg/dL           Relevant Orders    CBC and differential    Comprehensive metabolic panel    Lipid panel    TSH, 3rd generation with Free T4 reflex    LDL cholesterol, direct    Dermoid cyst     Management per Gyn. Fibroids     Management per Gyn. Other Visit Diagnoses       Dermatitis        Relevant Medications    mometasone (ELOCON) 0.1 % cream    Start Elocon BID PRN. To Derm if no improvement. High mean corpuscular hemoglobin concentration (MCHC)        Relevant Orders    Vitamin B12    Folate    CBC and differential    Pending labs. Colon cancer screening        Relevant Orders    Ambulatory referral to Gastroenterology    Family hx of colon cancer        Relevant Orders    Ambulatory referral to Gastroenterology             Return in about 4 months (around 3/7/2024) for 4mo - IFG, HL, Asthma, Labs. Future Appointments   Date Time Provider [address] [place] [address]   3/7/2024 10:00 AM Jennifer Hendricks [facility]   3/11/2024 10:40 AM DO [facility]        Subjective:     Tucker Perez is a 66 y.o. female who presents today for a follow-up on her chronic medical conditions.         HPI:  Chief Complaint   Patient presents with   • Follow-up     Return in about 6 weeks (around 11/7/2023) for F/U Asthma, Labs     -- Above per clinical staff and reviewed. --      HPI      Today:      Return in about 6 weeks (around 11/7/2023) for F/U Asthma, Labs;     Dermatitis - Treated for suspect Lyme disease 9/26/23 with doxycycline 100 mg twice daily x10 days. Negative Lyme testing. Rash still present and spreading to other body parts. Has rash on left medial thigh. Starts a few bumps, then bumps increase in size. She states she has stripes remaining thereafter. Rash is itchig. Using an eczema cream c benefit. Brenda Pelletier in [place], [address], but has not scheduled an appointment yet. From previous note -     Insect Bite on Medial R Elbow - Occurred 5-6 weeks ago, patient suspects spider bite as she had 2 small bite marks with a crater, bluish black blood in pit, and surrounding bumpy rash, which resolved after 3 weeks. 3-4  Weeks later, she sustained a 2nd insect bite on R forearm c similar appearance as first.  Rash has spread from Right forearm cephalad, to B/L arms, neck, and chest.  Rash is not present, but skin is itchy. Anterior neck rash is painful and itchy. Using OTC Allergra 180mg QD s relief, OTC moisturizer on neck c benefit. No sick contacts. She spends a lot of tie outdoors, has been bitten by multiple ticks - had tick bite on L forearm 3-4 weeks ago at time of possible spider bite. She has never been tested for Lyme Disease, but may have had symptoms of fatigue. Denies ECM lesion. Overweight - Watching diet. +Exercise - Hiking, biking, or gardening, yard work for 5-6 hours, 7 days per week. IFG - No Pre-DM meds previously. Hyperlipidemia - No statin previously. AR - Stable on Allergra PRN. Exercise induced Asthma - Not currently using Albuterol HFA. ACT on 25 11/7/23. Dermoid Cyst / Fibroid Jannet Martínez in [place], [address] Next appt 11/27/23, 12/4/23. Had serial transvaginal U/S.         H/O Pre-Skin Cancer - Management per Maria Luz Hameed at Dr. Chelsea Durham office at [facility] in [place], [place]. Next appt 2/24. Fam Hx Colon, Prostate, and Pancreatic Cancer - Pending Genetics consult 3/7/24. Thinks she had Tdap 2021 - unsure of location. From previous note:    Controlled Substance Review     [place] PDMP or [place]  reviewed: No red flags were identified; safe to proceed with prescription. .        Insect Bite on Medial R Elbow - Occurred 5-6 weeks ago, patient suspects spider bite as she had 2 small bite marks with a crater, bluish black blood in pit, and surrounding bumpy rash, which resolved after 3 weeks. 3-4  Weeks later, she sustained a 2nd insect bite on R forearm c similar appearance as first.  Rash has spread from Right forearm cephalad, to B/L arms, neck, and chest.  Rash is not present, but skin is itchy. Anterior neck rash is painful and itchy. Using OTC Allergra 180mg QD s relief, OTC moisturizer on neck c benefit. No sick contacts. She spends a lot of tie outdoors, has been bitten by multiple ticks - had tick bite on L forearm 3-4 weeks ago at time of possible spider bite. She has never been tested for Lyme Disease, but may have had symptoms of fatigue. Denies ECM lesion. Overweight - Watching diet. +Exercise - Hiking, biking, or gardening, yard work for 5-6 hours, 7 days per week. AR - Stable on Allergra. Exercise induced Asthma - Not currently using Albuterol HFA. Dermoid Cyst / Fibroid Cysts - Had serial transvaginal U/S -overdue. H/O Pre-Skin Cancer - Management per Maria Luz Hameed at Dr. Chelsea Durham office at [facility] in [place], [place]. Next appt 2/23. Reviewed:  [facility] Screen Dr. Zaki Dietrich in [place], [place]. Next appt 11/27/23, 12/4/23. Last Pap 2018. Denies H/O Abnormal Paps. No CRC previously. Sees Dentist q6 months. Sees Optho q1 year.      Thinks she had Tdap 2021 - unsure of location. The following portions of the patient's history were reviewed and updated as appropriate: allergies, current medications, past family history, past medical history, past social history, past surgical history and problem list.      Review of Systems   Constitutional:  Negative for appetite change, chills, diaphoresis, fatigue and fever. Respiratory:  Negative for chest tightness and shortness of breath. Cardiovascular:  Negative for chest pain. Gastrointestinal:  Negative for abdominal pain, blood in stool, diarrhea, nausea and vomiting. Genitourinary:  Negative for dysuria. Skin:  Positive for rash. Current Outpatient Medications   Medication Sig Dispense Refill   • mometasone (ELOCON) 0.1 % cream Apply topically 2 (two) times a day as needed (Rash) 15 g 0   • fexofenadine (ALLEGRA) 180 MG tablet Take 180 mg by mouth daily (Patient not taking: Reported on 11/7/2023)       No current facility-administered medications for this visit. Objective:  /76   Pulse 62   Temp 97.9 °F (36.6 °C)   Resp 16   Ht 5' 5" (1.651 m)   Wt 66.7 kg (147 lb)   LMP 01/01/2017 (Approximate)   SpO2 99%   BMI 24.46 kg/m²    Wt Readings from Last 3 Encounters:   11/07/23 66.7 kg (147 lb)   09/26/23 74.4 kg (164 lb)      BP Readings from Last 3 Encounters:   11/07/23 128/76   09/26/23 124/72          Physical Exam  Vitals and nursing note reviewed. Constitutional:       Appearance: Normal appearance. She is well-developed. HENT:      Head: Normocephalic and atraumatic. Eyes:      Conjunctiva/sclera: Conjunctivae normal.   Neck:      Thyroid: No thyromegaly. Cardiovascular:      Rate and Rhythm: Normal rate and regular rhythm. Pulses: Normal pulses. Heart sounds: Normal heart sounds. Pulmonary:      Effort: Pulmonary effort is normal.      Breath sounds: Normal breath sounds. Musculoskeletal:      Cervical back: Neck supple.    Skin:     Findings: Rash (Left medial thigh c 3cm x 2cm erythematous macular rash) present. Neurological:      General: No focal deficit present. Mental Status: She is alert and oriented to person, place, and time. Psychiatric:         Mood and Affect: Mood normal.         Lab Results:      Lab Results   Component Value Date    WBC 5.6 09/27/2023    HGB 14.2 09/27/2023    HCT 40.4 09/27/2023     09/27/2023    TRIG 110 09/27/2023    HDL 78 09/27/2023    LDLDIRECT 235 (H) 09/27/2023    ALT 22 09/27/2023    AST 17 09/27/2023    K 4.8 09/27/2023     09/27/2023    CREATININE 0.65 09/27/2023    BUN 12 09/27/2023    CO2 29 09/27/2023    HGBA1C 5.7 (H) 09/27/2023     No results found for: "URICACID"  Invalid input(s): "BASENAME" Vitamin D    No results found.      POCT Labs

## 2023-11-08 NOTE — ASSESSMENT & PLAN NOTE
Patient declines starting metformin  mg 3 pills daily and prediabetic education. Recommend lifestyle modifications.

## 2023-11-08 NOTE — ASSESSMENT & PLAN NOTE
Uncontrolled. Patient declines starting statin despite LDL greater than 190. Recommend lifestyle modifications. The ASCVD Risk score (Mark COLE, et al., 2019) failed to calculate for the following reasons:     The valid total cholesterol range is 130 to 320 mg/dL

## 2024-02-28 ENCOUNTER — TELEPHONE (OUTPATIENT)
Dept: GENETICS | Facility: CLINIC | Age: 59
End: 2024-02-28

## 2024-02-28 ENCOUNTER — DOCUMENTATION (OUTPATIENT)
Dept: GENETICS | Facility: CLINIC | Age: 59
End: 2024-02-28

## 2024-02-28 NOTE — TELEPHONE ENCOUNTER
I called and spoke with Jessica, she confirmed her upcoming appointment. She stated she received a new link for a questionnaire to be completed however she already completed progeny. I explained to Jessica that our office is now utilizing [facility] and that is the reason she received the new link. It was send to her via text and would like to be emailed. I confirmed that her email was entered when account was created.

## 2024-03-03 LAB
ALBUMIN SERPL-MCNC: 4.3 G/DL (ref 3.6–5.1)
ALBUMIN/GLOB SERPL: 2 (CALC) (ref 1–2.5)
ALP SERPL-CCNC: 73 U/L (ref 37–153)
ALT SERPL-CCNC: 14 U/L (ref 6–29)
AST SERPL-CCNC: 18 U/L (ref 10–35)
BASOPHILS # BLD AUTO: 39 CELLS/UL (ref 0–200)
BASOPHILS NFR BLD AUTO: 0.8 %
BILIRUB SERPL-MCNC: 0.6 MG/DL (ref 0.2–1.2)
BUN SERPL-MCNC: 12 MG/DL (ref 7–25)
BUN/CREAT SERPL: NORMAL (CALC) (ref 6–22)
CALCIUM SERPL-MCNC: 8.9 MG/DL (ref 8.6–10.4)
CHLORIDE SERPL-SCNC: 107 MMOL/L (ref 98–110)
CHOLEST SERPL-MCNC: 224 MG/DL
CHOLEST/HDLC SERPL: 3.3 (CALC)
CO2 SERPL-SCNC: 27 MMOL/L (ref 20–32)
CREAT SERPL-MCNC: 0.7 MG/DL (ref 0.5–1.03)
EOSINOPHIL # BLD AUTO: 172 CELLS/UL (ref 15–500)
EOSINOPHIL NFR BLD AUTO: 3.5 %
ERYTHROCYTE [DISTWIDTH] IN BLOOD BY AUTOMATED COUNT: 13.7 % (ref 11–15)
FOLATE SERPL-MCNC: 17 NG/ML
GFR/BSA.PRED SERPLBLD CYS-BASED-ARV: 101 ML/MIN/1.73M2
GLOBULIN SER CALC-MCNC: 2.2 G/DL (CALC) (ref 1.9–3.7)
GLUCOSE SERPL-MCNC: 92 MG/DL (ref 65–99)
HBA1C MFR BLD: 5.3 % OF TOTAL HGB
HCT VFR BLD AUTO: 40.1 % (ref 35–45)
HDLC SERPL-MCNC: 68 MG/DL
HGB BLD-MCNC: 13.5 G/DL (ref 11.7–15.5)
LDLC SERPL CALC-MCNC: 141 MG/DL (CALC)
LDLC SERPL DIRECT ASSAY-MCNC: 141 MG/DL
LYMPHOCYTES # BLD AUTO: 2646 CELLS/UL (ref 850–3900)
LYMPHOCYTES NFR BLD AUTO: 54 %
MCH RBC QN AUTO: 33.5 PG (ref 27–33)
MCHC RBC AUTO-ENTMCNC: 33.7 G/DL (ref 32–36)
MCV RBC AUTO: 99.5 FL (ref 80–100)
MONOCYTES # BLD AUTO: 510 CELLS/UL (ref 200–950)
MONOCYTES NFR BLD AUTO: 10.4 %
NEUTROPHILS # BLD AUTO: 1534 CELLS/UL (ref 1500–7800)
NEUTROPHILS NFR BLD AUTO: 31.3 %
NONHDLC SERPL-MCNC: 156 MG/DL (CALC)
PLATELET # BLD AUTO: 192 THOUSAND/UL (ref 140–400)
PMV BLD REES-ECKER: 13.3 FL (ref 7.5–12.5)
POTASSIUM SERPL-SCNC: 4.3 MMOL/L (ref 3.5–5.3)
PROT SERPL-MCNC: 6.5 G/DL (ref 6.1–8.1)
RBC # BLD AUTO: 4.03 MILLION/UL (ref 3.8–5.1)
SODIUM SERPL-SCNC: 140 MMOL/L (ref 135–146)
TRIGL SERPL-MCNC: 48 MG/DL
TSH SERPL-ACNC: 1.88 MIU/L (ref 0.4–4.5)
VIT B12 SERPL-MCNC: 263 PG/ML (ref 200–1100)
WBC # BLD AUTO: 4.9 THOUSAND/UL (ref 3.8–10.8)

## 2024-03-04 ENCOUNTER — TELEPHONE (OUTPATIENT)
Dept: HEMATOLOGY ONCOLOGY | Facility: CLINIC | Age: 59
End: 2024-03-04

## 2024-03-04 PROBLEM — E53.8 VITAMIN B12 DEFICIENCY: Status: ACTIVE | Noted: 2024-03-04

## 2024-03-04 NOTE — TELEPHONE ENCOUNTER
Spoke with patient about lab issues that led her to cancel appointment. She stated that she was unhappy with [facility] privacy policy and feels that they are more interested in selling her data than sequencing her data. I did explain there are other genetics companies, however they all have fairly similar privacy policies and maintain good practices regarding data privacy under HIPAA laws, however it is in her right to not proceed with testing. Patient took down names such as [facility] and [facility] and will look into those labs. She will cotnact our office in the future if she would like to proceed.

## 2024-03-04 NOTE — TELEPHONE ENCOUNTER
Appointment Change  Cancel, Reschedule, Change to Virtual      Who are you speaking with? Patient   If it is not the patient, is the caller listed on the communication consent form? N/A   Which provider is the appointment scheduled with? Jayda Petersen   When was the original appointment scheduled?    Please list date and time 3/7/24 10am   At which location is the appointment scheduled to take place? [place]   Was the appointment rescheduled?     Was the appointment changed from an in person visit to a virtual visit?    If so, please list the details of the change. N/a   What is the reason for the appointment change? She said she doesn't trust the company the genetics team uses       Was STAR transport scheduled? N/A   Does [facility]AR transport need to be scheduled for the new visit (if applicable) N/A   Does the patient need an infusion appointment rescheduled? N/A   Does the patient have an upcoming infusion appointment scheduled? If so, when? No   Is the patient undergoing chemotherapy? N/A   For appointments cancelled with less than 24 hours:  Was the no-show policy reviewed? N/A

## 2024-03-04 NOTE — RESULT ENCOUNTER NOTE
Unstable labs - will review with patient at upcoming appointment.    Hyperlipidemia - Recommend lifestyle modifications.    The 10-year ASCVD risk score (Mark COLE, et al., 2019) is: 2%    Values used to calculate the score:      Age: 58 years      Sex: Female      Is Non- : No      Diabetic: No      Tobacco smoker: No      Systolic Blood Pressure: 128 mmHg      Is BP treated: No      HDL Cholesterol: 68 mg/dL      Total Cholesterol: 224 mg/dL      Vitamin B12 Deficiency - Schedule for nurse visits for injections of Vitamin B12 1000mcg IM/SQ daily x 1 week, then weekly x 1 month, then start over-the-counter vitamin B12 1000mcg daily - take on an empty stomach.

## 2024-03-19 ENCOUNTER — OFFICE VISIT (OUTPATIENT)
Dept: FAMILY MEDICINE CLINIC | Facility: CLINIC | Age: 59
End: 2024-03-19
Payer: COMMERCIAL

## 2024-03-19 VITALS
HEART RATE: 58 BPM | TEMPERATURE: 98 F | DIASTOLIC BLOOD PRESSURE: 64 MMHG | WEIGHT: 126 LBS | OXYGEN SATURATION: 97 % | HEIGHT: 65 IN | BODY MASS INDEX: 20.99 KG/M2 | SYSTOLIC BLOOD PRESSURE: 106 MMHG | RESPIRATION RATE: 16 BRPM

## 2024-03-19 DIAGNOSIS — J45.990 EXERCISE-INDUCED ASTHMA: ICD-10-CM

## 2024-03-19 DIAGNOSIS — D21.9 FIBROIDS: ICD-10-CM

## 2024-03-19 DIAGNOSIS — E78.5 HYPERLIPIDEMIA, UNSPECIFIED HYPERLIPIDEMIA TYPE: ICD-10-CM

## 2024-03-19 DIAGNOSIS — I77.9 LEFT-SIDED CAROTID ARTERY DISEASE, UNSPECIFIED TYPE (HCC): ICD-10-CM

## 2024-03-19 DIAGNOSIS — R73.01 IFG (IMPAIRED FASTING GLUCOSE): Primary | ICD-10-CM

## 2024-03-19 DIAGNOSIS — E53.8 VITAMIN B12 DEFICIENCY: ICD-10-CM

## 2024-03-19 DIAGNOSIS — D36.9 DERMOID CYST: ICD-10-CM

## 2024-03-19 DIAGNOSIS — J30.1 NON-SEASONAL ALLERGIC RHINITIS DUE TO POLLEN: ICD-10-CM

## 2024-03-19 PROCEDURE — 99214 OFFICE O/P EST MOD 30 MIN: CPT | Performed by: FAMILY MEDICINE

## 2024-03-19 RX ORDER — VITAMIN K2 90 MCG
1000 CAPSULE ORAL DAILY
COMMUNITY

## 2024-03-19 RX ORDER — ALBUTEROL SULFATE 90 UG/1
2 AEROSOL, METERED RESPIRATORY (INHALATION) EVERY 4 HOURS PRN
Qty: 18 G | Refills: 0 | Status: SHIPPED | OUTPATIENT
Start: 2024-03-19

## 2024-03-19 RX ORDER — CLOBETASOL PROPIONATE 0.5 MG/G
OINTMENT TOPICAL
COMMUNITY
Start: 2024-02-02

## 2024-03-19 NOTE — PROGRESS NOTES
Assessment/Plan:  Problem List Items Addressed This Visit        Cardiovascular and Mediastinum    Left-sided carotid artery disease (HCC)     Stable.  Repeat B/L Carotid U/S 2/27/25.           Relevant Orders    VAS carotid complete study       Respiratory    Exercise-induced asthma     Stable on Albuterol HFA PRN.  Patient will need pre and post spirometry in the future when COVID restrictions are lifted.         Relevant Medications    albuterol (PROVENTIL HFA,VENTOLIN HFA) 90 mcg/act inhaler    AR (allergic rhinitis)     Stable on Allegra.            Endocrine    IFG (impaired fasting glucose) - Primary     Stable.  Patient declines starting metformin  mg 3 pills daily and prediabetic education.  Recommend lifestyle modifications.           Relevant Orders    Comprehensive metabolic panel    Hemoglobin A1C       Other    Hyperlipidemia     Improved.  Stable s statin.  Recommend lifestyle modifications.    The 10-year ASCVD risk score (Mark COLE, et al., 2019) is: 1.4%    Values used to calculate the score:      Age: 58 years      Sex: Female      Is Non- : No      Diabetic: No      Tobacco smoker: No      Systolic Blood Pressure: 106 mmHg      Is BP treated: No      HDL Cholesterol: 68 mg/dL      Total Cholesterol: 224 mg/dL           Relevant Orders    CBC and differential    Comprehensive metabolic panel    Lipid panel    TSH, 3rd generation with Free T4 reflex    LDL cholesterol, direct    Vitamin B12 deficiency     New.      Vitamin B12 Deficiency - Schedule for nurse visits for injections of Vitamin B12 1000mcg IM/SQ daily x 1 week, then weekly x 1 month, then start over-the-counter vitamin B12 1000mcg daily - take on an empty stomach.            Relevant Orders    CBC and differential    Folate    Vitamin B12    Dermoid cyst     Management per Gyn.           Fibroids     Management per Gyn.                Return in about 4 months (around 7/19/2024) for 4mo - IFG, HL, Asthma,  Vit B12 Def, Labs.      Future Appointments   Date Time Provider Department Center   7/30/2024 11:20 AM Hilaria Lundberg,  [facility]          Subjective:     Jessica is a 58 y.o. female who presents today for a follow-up on her chronic medical conditions.        HPI:  Chief Complaint   Patient presents with   • Follow-up     4mo - IFG, HL, Asthma, Labs. Pt would like  to review labs she had done recently for hem/onc, Cory Gallagher MD in [place], [place]. ACT score 25. Would like to know if she can have a rescue inhaler just in case she would ever need it.      -- Above per clinical staff and reviewed. --      HPI      Today:      Return in about 4 months (around 3/7/2024) for 4mo - IFG, HL, Asthma, Labs.           Overweight - Watching diet.  No Exercise - Previously Hiking, biking, or gardening, yard work for 5-6 hours, 7 days per week.      IFG - No Pre-DM meds previously.       Hyperlipidemia - No statin previously.         AR - Stable on Allergra PRN.     Exercise induced Asthma - Not currently using Albuterol HFA.  ACT on 25 3/19/24.       Dermoid Cyst / Fibroid Cysts - Sees Gyn Dr. Stephanie David in [place], [place].  Next appt Spring 2024.  Had serial transvaginal U/S.      Easy Bruising - Seen by Heme/Onc Dr. Cory Gallagher in [place], [place].  Stable, patient was cleared for Gyn surgery.  Patient states Heme/Onc not concerned with lab abnormalities 12/11/23 - elevated Ferritin 178 and Low Vitamin B12 405.    Right Carotid Artery Disease - Per [facility] Screening 2/27/24.     Vitamin B12 Deficiency - She declines injections of Vitamin B12.  She will start over-the-counter vitamin B12 1000mcg daily - take on an empty stomach.        Dermatitis - Management per Derm Dr. Goode in [place], [place] - Next appt 7/24.  Stable on Clobetasol ointment 0.05% ointment BID PRN.       H/O Pre-Skin Cancer - Management per Yasmeen Collins at Dr. Goode's Derm office at [facility] in [place], [place].  Next appt  7/24.     Fam Hx Colon, Prostate, and Pancreatic Cancer - She declines Genetics consult 3/7/24 due to privacy issues.          Reviewed:  Labs 3/2/24     Sees Gyn Dr. Stephanie David in [place], [place].  Next appt Spring 20.  Last Pap 2018.  Denies H/O Abnormal Paps.       No CRC previously - Pending colonoscopy consult in [place], [place] - Next 3/26/24    Thinks she had Tdap 2021 - unsure of location.    PHQ-2/9 Depression Screening    Little interest or pleasure in doing things: 0 - not at all  Feeling down, depressed, or hopeless: 0 - not at all  PHQ-2 Score: 0  PHQ-2 Interpretation: Negative depression screen           The following portions of the patient's history were reviewed and updated as appropriate: allergies, current medications, past family history, past medical history, past social history, past surgical history and problem list.      Review of Systems   Constitutional:  Negative for appetite change, chills, diaphoresis, fatigue, fever and unexpected weight change.   Respiratory:  Negative for chest tightness and shortness of breath.    Cardiovascular:  Negative for chest pain.   Gastrointestinal:  Negative for abdominal pain, blood in stool, diarrhea, nausea and vomiting.   Genitourinary:  Positive for pelvic pain (Due to fibroids). Negative for dysuria.        Current Outpatient Medications   Medication Sig Dispense Refill   • albuterol (PROVENTIL HFA,VENTOLIN HFA) 90 mcg/act inhaler Inhale 2 puffs every 4 (four) hours as needed for shortness of breath 18 g 0   • Cholecalciferol (Vitamin D3) 25 MCG (1000 UT) capsule Take 1,000 Units by mouth daily     • Citrus Bergamot 650 MG TABS Take 500 mg by mouth in the morning     • clobetasol (TEMOVATE) 0.05 % ointment APPLY ON THE SKIN TWICE A DAY (Patient not taking: Reported on 3/19/2024)     • fexofenadine (ALLEGRA) 180 MG tablet Take 180 mg by mouth daily as needed (Patient not taking: Reported on 3/19/2024)       No current facility-administered medications  "for this visit.       Objective:  /64   Pulse 58   Temp 98 °F (36.7 °C)   Resp 16   Ht 5' 5\" (1.651 m)   Wt 57.2 kg (126 lb)   LMP 01/01/2017 (Approximate)   SpO2 97%   BMI 20.97 kg/m²    Wt Readings from Last 3 Encounters:   03/19/24 57.2 kg (126 lb)   11/07/23 66.7 kg (147 lb)   09/26/23 74.4 kg (164 lb)      BP Readings from Last 3 Encounters:   03/19/24 106/64   11/07/23 128/76   09/26/23 124/72          Physical Exam  Vitals and nursing note reviewed.   Constitutional:       Appearance: Normal appearance. She is well-developed and normal weight.   HENT:      Head: Normocephalic and atraumatic.   Eyes:      Conjunctiva/sclera: Conjunctivae normal.   Neck:      Thyroid: No thyromegaly.      Vascular: No carotid bruit.   Cardiovascular:      Rate and Rhythm: Normal rate and regular rhythm.      Pulses: Normal pulses.      Heart sounds: Normal heart sounds.   Pulmonary:      Effort: Pulmonary effort is normal.      Breath sounds: Normal breath sounds.   Abdominal:      General: Abdomen is flat. Bowel sounds are normal. There is no distension.      Palpations: Abdomen is soft. There is no mass.      Tenderness: There is no abdominal tenderness. There is no guarding or rebound.   Musculoskeletal:         General: No swelling or tenderness.      Cervical back: Neck supple.      Right lower leg: No edema.      Left lower leg: No edema.   Lymphadenopathy:      Cervical: No cervical adenopathy.   Neurological:      General: No focal deficit present.      Mental Status: She is alert and oriented to person, place, and time.   Psychiatric:         Mood and Affect: Mood normal.         Behavior: Behavior normal.         Thought Content: Thought content normal.         Judgment: Judgment normal.         Lab Results:      Lab Results   Component Value Date    WBC 4.9 03/02/2024    HGB 13.5 03/02/2024    HCT 40.1 03/02/2024     03/02/2024    TRIG 48 03/02/2024    HDL 68 03/02/2024    LDLDIRECT 141 (H) " "03/02/2024    ALT 14 03/02/2024    AST 18 03/02/2024    K 4.3 03/02/2024     03/02/2024    CREATININE 0.70 03/02/2024    BUN 12 03/02/2024    CO2 27 03/02/2024    HGBA1C 5.3 03/02/2024     No results found for: \"URICACID\"  Invalid input(s): \"BASENAME\" Vitamin D    No results found.     POCT Labs        Depression Screening and Follow-up Plan: Patient was screened for depression during today's encounter. They screened negative with a PHQ-2 score of 0.                    "

## 2024-03-19 NOTE — PATIENT INSTRUCTIONS
Vitamin B12 Deficiency - Schedule for nurse visits for injections of Vitamin B12 1000mcg IM/SQ daily x 1 week, then weekly x 1 month, then start over-the-counter vitamin B12 1000mcg daily - take on an empty stomach.

## 2024-03-20 ENCOUNTER — TELEPHONE (OUTPATIENT)
Dept: ADMINISTRATIVE | Facility: OTHER | Age: 59
End: 2024-03-20

## 2024-03-20 NOTE — TELEPHONE ENCOUNTER
----- Message from Hilaria Lundberg DO sent at 3/19/2024  3:00 PM EDT -----  Regarding: Pap per  Gyn Dr. Stephanie David in [place], [place] - more recent Pap than 2018 in [facility]  03/19/24 3:01 PM    Linda, our patient Jessica Pat has had Pap Smear (HPV) aka Cervical Cancer Screening completed/performed. Please assist in updating the patient chart by making an External outreach to  facility located in . The date of service is .    Pap per  Gyn Dr. Stephanie David in [place], [place] - more recent Pap than 2018 in [facility]    Thank you,  Hilaria Lundberg DO  [facility]

## 2024-03-20 NOTE — LETTER
Procedure Request Form: Cervical Cancer Screening      Date Requested: 24  Patient: Jessica Pat  Patient : 1965   Referring Provider: Hilaria Lundberg, DO        Date of Procedure ______________________________       The above patient has informed us that they have completed their   most recent Cervical Cancer Screening at your facility. Please complete   this form and attach all corresponding procedure reports/results.    Comments __________________________________________________________  ____________________________________________________________________  ____________________________________________________________________  ____________________________________________________________________    Facility Completing Procedure _________________________________________    Form Completed By (print name) _______________________________________      Signature __________________________________________________________      These reports are needed for  compliance.    Please fax this completed form and a copy of the procedure report to our office located at [address] [place] [address] as soon as possible to Fax [phone] cecilio Ayon: Phone [phone]    We thank you for your assistance in treating our mutual patient.

## 2024-03-20 NOTE — TELEPHONE ENCOUNTER
Upon review of the In Basket request and the patient's chart, initial outreach has been made via fax to facility. Please see Contacts section for details.     Thank you  Gabo Ackerman

## 2024-03-23 NOTE — ASSESSMENT & PLAN NOTE
New.      Vitamin B12 Deficiency - Schedule for nurse visits for injections of Vitamin B12 1000mcg IM/SQ daily x 1 week, then weekly x 1 month, then start over-the-counter vitamin B12 1000mcg daily - take on an empty stomach.

## 2024-03-23 NOTE — ASSESSMENT & PLAN NOTE
Stable.  Patient declines starting metformin  mg 3 pills daily and prediabetic education.  Recommend lifestyle modifications.

## 2024-03-23 NOTE — ASSESSMENT & PLAN NOTE
Stable on Albuterol HFA PRN.  Patient will need pre and post spirometry in the future when COVID restrictions are lifted.

## 2024-04-03 PROBLEM — Z86.0100 HISTORY OF COLON POLYPS: Status: ACTIVE | Noted: 2024-04-03

## 2024-04-03 PROBLEM — Z86.010 HISTORY OF COLON POLYPS: Status: ACTIVE | Noted: 2024-04-03

## 2024-04-04 ENCOUNTER — TELEPHONE (OUTPATIENT)
Dept: ADMINISTRATIVE | Facility: OTHER | Age: 59
End: 2024-04-04

## 2024-04-04 NOTE — TELEPHONE ENCOUNTER
----- Message from Hilaria Lundberg DO sent at 4/3/2024  9:19 PM EDT -----  Regarding: Colonoscopy 3/29/24 - Labs External Order Pathology  04/03/24 9:19 PM    Linda, our patient Jessica Pat has had CRC: Colonoscopy completed/performed. Please assist in updating the patient chart by pulling the document from  Tab within Chart Review. The date of service is .    Colonoscopy 3/29/24 - Labs External Order Pathology     Thank you,  Hilaria Lundberg DO  [facility]

## 2024-04-04 NOTE — LETTER
Procedure Request Form: Colonoscopy      Date Requested: 24  Patient: Jessica Pat  Patient : 1965   Referring Provider: Hilaria Lundberg DO        Date of Procedure _______3/29/24_______________________       The above patient has informed us that they have completed their   most recent Colonoscopy at your facility. Please complete   this form and attach all corresponding procedure reports/results.    Comments __________________________________________________________  ____________________________________________________________________  ____________________________________________________________________  ____________________________________________________________________    Facility Completing Procedure _________________________________________    Form Completed By (print name) _______________________________________      Signature __________________________________________________________      These reports are needed for  compliance.    Please fax this completed form and a copy of the procedure report to our office located at [address] [place], [place] [id number] as soon as possible to Fax [phone] cecilio Ayon: Phone [phone]    We thank you for your assistance in treating our mutual patient.

## 2024-04-04 NOTE — TELEPHONE ENCOUNTER
As a follow-up, a second attempt has been made for outreach via fax to facility. Please see Contacts section for details.    Thank you  Gabo Ackerman

## 2024-04-30 ENCOUNTER — HOSPITAL ENCOUNTER (OUTPATIENT)
Dept: RADIOLOGY | Facility: HOSPITAL | Age: 59
Discharge: HOME | End: 2024-04-30
Payer: COMMERCIAL

## 2024-04-30 ENCOUNTER — OFFICE VISIT (OUTPATIENT)
Dept: FAMILY MEDICINE CLINIC | Facility: CLINIC | Age: 59
End: 2024-04-30
Payer: COMMERCIAL

## 2024-04-30 VITALS
DIASTOLIC BLOOD PRESSURE: 72 MMHG | OXYGEN SATURATION: 98 % | RESPIRATION RATE: 18 BRPM | BODY MASS INDEX: 20.33 KG/M2 | HEART RATE: 76 BPM | WEIGHT: 122 LBS | HEIGHT: 65 IN | TEMPERATURE: 97.9 F | SYSTOLIC BLOOD PRESSURE: 106 MMHG

## 2024-04-30 DIAGNOSIS — R07.89 LEFT-SIDED CHEST WALL PAIN: Primary | ICD-10-CM

## 2024-04-30 DIAGNOSIS — R07.89 LEFT-SIDED CHEST WALL PAIN: ICD-10-CM

## 2024-04-30 DIAGNOSIS — R10.12 LUQ PAIN: ICD-10-CM

## 2024-04-30 PROCEDURE — 99214 OFFICE O/P EST MOD 30 MIN: CPT | Performed by: FAMILY MEDICINE

## 2024-04-30 PROCEDURE — 71046 X-RAY EXAM CHEST 2 VIEWS: CPT

## 2024-04-30 NOTE — PROGRESS NOTES
Assessment/Plan:       Problem List Items Addressed This Visit    None  Visit Diagnoses     Left-sided chest wall pain    -  Primary    Relevant Orders    XR chest pa & lateral (Completed)    CBC and differential (Completed)    LUQ pain        Relevant Orders    CBC and differential (Completed)    Comprehensive metabolic panel (Completed)      Check chest x-ray and labs  Has had CT abd/pelvis  Following with gyn for dermoid cyst           Most recent labs reviewed     Subjective:     Jessica is a 58 y.o. female here today with chief complaint below:  Chief Complaint   Patient presents with   • Pain     Left side pain. Went to ER, they couldn't find anything wrong. Got better for a little while, two days ago she went for a walk she was having severe pain around the L side rib cage. Patient states that the symptoms occurred after she had colonoscopy. Patient is concerned that it is related. March 29 was her colonoscopy. Patient states the pain being a 5/10. Patient states that it gets worse when the patient is moving around and doing things. Patient states that it is sensitive to the touch.   • Breathing Problem     Patient states breathing triggers L sided pain as well.      - CC above per clinical staff and reviewed.    HPI:  Pt here with concern for pain L side of ribs.  Worse with deep breath.     Colonoscopy 3/29/24. Three polyps removed.  Notes her blood pressure was very low when she was leaving and she was lightheaded.  She had trouble walking to leave.    The first two days after the colonoscopy she had pain in her entire abdomen, L side, radiating to L chest and L shoulder.   Called on call service worried w/ the pain and with temps in the 99s.   Went to the ER the following Monday on 4/1/24.  Had CT abd.  No acute changes.      Over the following three weeks, pain was subsiding.   Then on Sunday went for a hike which was mild compared to her typical and she could feel the pain start to come on.  By yesterday  "she felt worse.  A bit better today, but still sore.  Typically she can handle fairly serious hiking for hours.     When the pain started, she felt dizzy as well.       Since her colonoscopy, her BP is running low.    She feels foggy since having propofol.    No longer getting chills or feeling feverish.   Normal Bms (took about four days after colonoscopy to normalize again)   Eats a healthy diet overall.     She follows w gyn.  Has a known L dermoid cyst.        The following portions of the patient's history were reviewed and updated as appropriate: allergies, current medications, past family history, past medical history, past social history, past surgical history and problem list.    ROS:  Review of Systems       Objective:      /72 (BP Location: Left arm, Patient Position: Sitting, Cuff Size: Standard)   Pulse 76   Temp 97.9 °F (36.6 °C) (Temporal)   Resp 18   Ht 5' 5\" (1.651 m)   Wt 55.3 kg (122 lb)   LMP 01/01/2017 (Approximate)   SpO2 98%   BMI 20.30 kg/m²   BP Readings from Last 3 Encounters:   04/30/24 106/72   03/19/24 106/64   11/07/23 128/76     Wt Readings from Last 3 Encounters:   04/30/24 55.3 kg (122 lb)   03/19/24 57.2 kg (126 lb)   11/07/23 66.7 kg (147 lb)      Notes intentional weight loss- has been working on lower carb diet b/c of prediabetes.            Physical Exam:   Physical Exam  Vitals and nursing note reviewed.   Constitutional:       General: She is not in acute distress.     Appearance: Normal appearance. She is not ill-appearing.   Cardiovascular:      Rate and Rhythm: Normal rate and regular rhythm.      Heart sounds: No murmur heard.  Pulmonary:      Effort: Pulmonary effort is normal.      Breath sounds: Normal breath sounds.   Chest:       Abdominal:      Palpations: Abdomen is soft.      Tenderness: There is abdominal tenderness (LUQ tenderness along the L lower ribs anteriorly to mid-axillary line). There is no guarding or rebound.   Lymphadenopathy:      " Cervical: No cervical adenopathy.   Neurological:      Mental Status: She is alert and oriented to person, place, and time.      Gait: Gait normal.   Psychiatric:         Mood and Affect: Mood normal.         Behavior: Behavior normal.

## 2024-05-01 ENCOUNTER — APPOINTMENT (OUTPATIENT)
Dept: LAB | Facility: CLINIC | Age: 59
End: 2024-05-01
Payer: COMMERCIAL

## 2024-05-01 DIAGNOSIS — Z11.59 NEED FOR HEPATITIS C SCREENING TEST: ICD-10-CM

## 2024-05-01 DIAGNOSIS — M79.10 MYALGIA: ICD-10-CM

## 2024-05-01 DIAGNOSIS — R07.89 LEFT-SIDED CHEST WALL PAIN: ICD-10-CM

## 2024-05-01 DIAGNOSIS — Z11.4 SCREENING FOR HIV (HUMAN IMMUNODEFICIENCY VIRUS): ICD-10-CM

## 2024-05-01 DIAGNOSIS — S50.862A INSECT BITE OF LEFT FOREARM, INITIAL ENCOUNTER: ICD-10-CM

## 2024-05-01 DIAGNOSIS — L30.9 DERMATITIS: ICD-10-CM

## 2024-05-01 DIAGNOSIS — R10.12 LUQ PAIN: ICD-10-CM
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
## 2024-05-01 LAB
BASOPHILS # BLD AUTO: 0.05 THOUSANDS/ÂΜL (ref 0–0.1)
BASOPHILS NFR BLD AUTO: 1 % (ref 0–1)
EOSINOPHIL # BLD AUTO: 0.17 THOUSAND/ÂΜL (ref 0–0.61)
EOSINOPHIL NFR BLD AUTO: 3 % (ref 0–6)
ERYTHROCYTE [DISTWIDTH] IN BLOOD BY AUTOMATED COUNT: 13.7 % (ref 11.6–15.1)
HCT VFR BLD AUTO: 40.4 % (ref 34.8–46.1)
HGB BLD-MCNC: 13.7 G/DL (ref 11.5–15.4)
IMM GRANULOCYTES # BLD AUTO: 0.01 THOUSAND/UL (ref 0–0.2)
IMM GRANULOCYTES NFR BLD AUTO: 0 % (ref 0–2)
LYMPHOCYTES # BLD AUTO: 2.76 THOUSANDS/ÂΜL (ref 0.6–4.47)
LYMPHOCYTES NFR BLD AUTO: 55 % (ref 14–44)
MCH RBC QN AUTO: 34.3 PG (ref 26.8–34.3)
MCHC RBC AUTO-ENTMCNC: 33.9 G/DL (ref 31.4–37.4)
MCV RBC AUTO: 101 FL (ref 82–98)
MONOCYTES # BLD AUTO: 0.48 THOUSAND/ÂΜL (ref 0.17–1.22)
MONOCYTES NFR BLD AUTO: 9 % (ref 4–12)
NEUTROPHILS # BLD AUTO: 1.63 THOUSANDS/ÂΜL (ref 1.85–7.62)
NEUTS SEG NFR BLD AUTO: 32 % (ref 43–75)
NRBC BLD AUTO-RTO: 0 /100 WBCS
PLATELET # BLD AUTO: 165 THOUSANDS/UL (ref 149–390)
PMV BLD AUTO: 12.6 FL (ref 8.9–12.7)
RBC # BLD AUTO: 4 MILLION/UL (ref 3.81–5.12)
WBC # BLD AUTO: 5.1 THOUSAND/UL (ref 4.31–10.16)

## 2024-05-01 PROCEDURE — 36415 COLL VENOUS BLD VENIPUNCTURE: CPT

## 2024-05-01 PROCEDURE — 80053 COMPREHEN METABOLIC PANEL: CPT

## 2024-05-01 PROCEDURE — 85025 COMPLETE CBC W/AUTO DIFF WBC: CPT

## 2024-05-02 ENCOUNTER — PATIENT MESSAGE (OUTPATIENT)
Dept: FAMILY MEDICINE CLINIC | Facility: CLINIC | Age: 59
End: 2024-05-02

## 2024-05-03 DIAGNOSIS — D70.9 NEUTROPENIA, UNSPECIFIED TYPE (HCC): ICD-10-CM

## 2024-05-03 DIAGNOSIS — D72.820 LYMPHOCYTOSIS: Primary | ICD-10-CM

## 2024-05-08 LAB
ALBUMIN SERPL BCP-MCNC: 4.1 G/DL (ref 3.5–5)
ALP SERPL-CCNC: 58 U/L (ref 34–104)
ALT SERPL W P-5'-P-CCNC: 18 U/L (ref 7–52)
ANION GAP SERPL CALCULATED.3IONS-SCNC: 8 MMOL/L (ref 4–13)
AST SERPL W P-5'-P-CCNC: 16 U/L (ref 13–39)
BILIRUB SERPL-MCNC: 0.47 MG/DL (ref 0.2–1)
BUN SERPL-MCNC: 14 MG/DL (ref 5–25)
CALCIUM SERPL-MCNC: 8.9 MG/DL (ref 8.4–10.2)
CHLORIDE SERPL-SCNC: 106 MMOL/L (ref 96–108)
CO2 SERPL-SCNC: 27 MMOL/L (ref 21–32)
CREAT SERPL-MCNC: 0.7 MG/DL (ref 0.6–1.3)
GFR SERPL CREATININE-BSD FRML MDRD: 97 ML/MIN/1.73SQ M
GLUCOSE P FAST SERPL-MCNC: 92 MG/DL (ref 65–99)
POTASSIUM SERPL-SCNC: 4.6 MMOL/L (ref 3.5–5.3)
PROT SERPL-MCNC: 6.3 G/DL (ref 6.4–8.4)
SODIUM SERPL-SCNC: 141 MMOL/L (ref 135–147)

## 2024-05-15 NOTE — TELEPHONE ENCOUNTER
Upon review of the In Basket request we were able to locate, review, and update the patient chart as requested for Pap Smear (HPV) aka Cervical Cancer Screening.    Any additional questions or concerns should be emailed to the Practice Liaisons via the appropriate education email address, please do not reply via In Basket.    Thank you  Gabo Ackerman

## 2024-06-05 ENCOUNTER — APPOINTMENT (OUTPATIENT)
Dept: LAB | Facility: CLINIC | Age: 59
End: 2024-06-05
Payer: COMMERCIAL

## 2024-06-05 DIAGNOSIS — D70.9 NEUTROPENIA, UNSPECIFIED TYPE (HCC): ICD-10-CM

## 2024-06-05 LAB
BASOPHILS # BLD AUTO: 0.04 THOUSANDS/ÂΜL (ref 0–0.1)
BASOPHILS NFR BLD AUTO: 1 % (ref 0–1)
EOSINOPHIL # BLD AUTO: 0.13 THOUSAND/ÂΜL (ref 0–0.61)
EOSINOPHIL NFR BLD AUTO: 3 % (ref 0–6)
ERYTHROCYTE [DISTWIDTH] IN BLOOD BY AUTOMATED COUNT: 13.6 % (ref 11.6–15.1)
HCT VFR BLD AUTO: 40.8 % (ref 34.8–46.1)
HGB BLD-MCNC: 13.8 G/DL (ref 11.5–15.4)
IMM GRANULOCYTES # BLD AUTO: 0 THOUSAND/UL (ref 0–0.2)
IMM GRANULOCYTES NFR BLD AUTO: 0 % (ref 0–2)
LYMPHOCYTES # BLD AUTO: 2.4 THOUSANDS/ÂΜL (ref 0.6–4.47)
LYMPHOCYTES NFR BLD AUTO: 51 % (ref 14–44)
MCH RBC QN AUTO: 34 PG (ref 26.8–34.3)
MCHC RBC AUTO-ENTMCNC: 33.8 G/DL (ref 31.4–37.4)
MCV RBC AUTO: 101 FL (ref 82–98)
MONOCYTES # BLD AUTO: 0.44 THOUSAND/ÂΜL (ref 0.17–1.22)
MONOCYTES NFR BLD AUTO: 9 % (ref 4–12)
NEUTROPHILS # BLD AUTO: 1.71 THOUSANDS/ÂΜL (ref 1.85–7.62)
NEUTS SEG NFR BLD AUTO: 36 % (ref 43–75)
NRBC BLD AUTO-RTO: 0 /100 WBCS
PLATELET # BLD AUTO: 173 THOUSANDS/UL (ref 149–390)
PMV BLD AUTO: 12.6 FL (ref 8.9–12.7)
RBC # BLD AUTO: 4.06 MILLION/UL (ref 3.81–5.12)
WBC # BLD AUTO: 4.72 THOUSAND/UL (ref 4.31–10.16)

## 2024-06-05 PROCEDURE — 36415 COLL VENOUS BLD VENIPUNCTURE: CPT

## 2024-06-05 PROCEDURE — 85025 COMPLETE CBC W/AUTO DIFF WBC: CPT

## 2024-06-12 NOTE — TELEPHONE ENCOUNTER
Upon review of the In Basket request we were able to locate, review, and update the patient chart as requested for CRC: Colonoscopy.    Any additional questions or concerns should be emailed to the Practice Liaisons via the appropriate education email address, please do not reply via In Basket.    Thank you  Gabo Ackerman   [facility]

## 2024-07-19 ENCOUNTER — LAB (OUTPATIENT)
Dept: LAB | Facility: CLINIC | Age: 59
End: 2024-07-19
Payer: COMMERCIAL

## 2024-07-19 DIAGNOSIS — S50.862A INSECT BITE OF LEFT FOREARM, INITIAL ENCOUNTER: ICD-10-CM
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
## 2024-07-19 DIAGNOSIS — L30.9 DERMATITIS: ICD-10-CM

## 2024-07-19 DIAGNOSIS — E53.8 VITAMIN B12 DEFICIENCY: ICD-10-CM

## 2024-07-19 DIAGNOSIS — R73.01 IFG (IMPAIRED FASTING GLUCOSE): ICD-10-CM

## 2024-07-19 DIAGNOSIS — E78.5 HYPERLIPIDEMIA, UNSPECIFIED HYPERLIPIDEMIA TYPE: ICD-10-CM

## 2024-07-19 LAB
25(OH)D3 SERPL-MCNC: 39.4 NG/ML (ref 30–100)
ALBUMIN SERPL BCG-MCNC: 4.1 G/DL (ref 3.5–5)
ALP SERPL-CCNC: 62 U/L (ref 34–104)
ALT SERPL W P-5'-P-CCNC: 15 U/L (ref 7–52)
ANION GAP SERPL CALCULATED.3IONS-SCNC: 7 MMOL/L (ref 4–13)
AST SERPL W P-5'-P-CCNC: 17 U/L (ref 13–39)
BASOPHILS # BLD AUTO: 0.03 THOUSANDS/ÂΜL (ref 0–0.1)
BASOPHILS NFR BLD AUTO: 1 % (ref 0–1)
BILIRUB SERPL-MCNC: 0.63 MG/DL (ref 0.2–1)
BUN SERPL-MCNC: 17 MG/DL (ref 5–25)
CALCIUM SERPL-MCNC: 9.3 MG/DL (ref 8.4–10.2)
CHLORIDE SERPL-SCNC: 105 MMOL/L (ref 96–108)
CHOLEST SERPL-MCNC: 209 MG/DL
CO2 SERPL-SCNC: 29 MMOL/L (ref 21–32)
CREAT SERPL-MCNC: 0.71 MG/DL (ref 0.6–1.3)
EOSINOPHIL # BLD AUTO: 0.14 THOUSAND/ÂΜL (ref 0–0.61)
EOSINOPHIL NFR BLD AUTO: 3 % (ref 0–6)
ERYTHROCYTE [DISTWIDTH] IN BLOOD BY AUTOMATED COUNT: 13.4 % (ref 11.6–15.1)
EST. AVERAGE GLUCOSE BLD GHB EST-MCNC: 111 MG/DL
FOLATE SERPL-MCNC: 15 NG/ML
GFR SERPL CREATININE-BSD FRML MDRD: 95 ML/MIN/1.73SQ M
GLUCOSE P FAST SERPL-MCNC: 85 MG/DL (ref 65–99)
HBA1C MFR BLD: 5.5 %
HCT VFR BLD AUTO: 40.4 % (ref 34.8–46.1)
HCV AB SER QL: NORMAL
HDLC SERPL-MCNC: 75 MG/DL
HGB BLD-MCNC: 13.5 G/DL (ref 11.5–15.4)
IMM GRANULOCYTES # BLD AUTO: 0 THOUSAND/UL (ref 0–0.2)
IMM GRANULOCYTES NFR BLD AUTO: 0 % (ref 0–2)
LDLC SERPL CALC-MCNC: 122 MG/DL (ref 0–100)
LDLC SERPL DIRECT ASSAY-MCNC: 136 MG/DL (ref 0–100)
LYMPHOCYTES # BLD AUTO: 2.42 THOUSANDS/ÂΜL (ref 0.6–4.47)
LYMPHOCYTES NFR BLD AUTO: 47 % (ref 14–44)
MCH RBC QN AUTO: 33.6 PG (ref 26.8–34.3)
MCHC RBC AUTO-ENTMCNC: 33.4 G/DL (ref 31.4–37.4)
MCV RBC AUTO: 101 FL (ref 82–98)
MONOCYTES # BLD AUTO: 0.49 THOUSAND/ÂΜL (ref 0.17–1.22)
MONOCYTES NFR BLD AUTO: 10 % (ref 4–12)
NEUTROPHILS # BLD AUTO: 1.98 THOUSANDS/ÂΜL (ref 1.85–7.62)
NEUTS SEG NFR BLD AUTO: 39 % (ref 43–75)
NONHDLC SERPL-MCNC: 134 MG/DL
NRBC BLD AUTO-RTO: 0 /100 WBCS
PLATELET # BLD AUTO: 181 THOUSANDS/UL (ref 149–390)
PMV BLD AUTO: 12.3 FL (ref 8.9–12.7)
POTASSIUM SERPL-SCNC: 4.5 MMOL/L (ref 3.5–5.3)
PROT SERPL-MCNC: 6.7 G/DL (ref 6.4–8.4)
RBC # BLD AUTO: 4.02 MILLION/UL (ref 3.81–5.12)
SODIUM SERPL-SCNC: 141 MMOL/L (ref 135–147)
TRIGL SERPL-MCNC: 58 MG/DL
TSH SERPL DL<=0.05 MIU/L-ACNC: 2.44 UIU/ML (ref 0.45–4.5)
VIT B12 SERPL-MCNC: 490 PG/ML (ref 180–914)
WBC # BLD AUTO: 5.06 THOUSAND/UL (ref 4.31–10.16)

## 2024-07-19 PROCEDURE — 83036 HEMOGLOBIN GLYCOSYLATED A1C: CPT

## 2024-07-19 PROCEDURE — 87478 BORRELIA MIYAMOTOI AMP PRB: CPT | Performed by: FAMILY MEDICINE

## 2024-07-19 PROCEDURE — 87468 ANAPLSMA PHGCYTOPHLM AMP PRB: CPT

## 2024-07-19 PROCEDURE — 84443 ASSAY THYROID STIM HORMONE: CPT

## 2024-07-19 PROCEDURE — 87801 DETECT AGNT MULT DNA AMPLI: CPT | Performed by: FAMILY MEDICINE

## 2024-07-19 PROCEDURE — 80053 COMPREHEN METABOLIC PANEL: CPT

## 2024-07-19 PROCEDURE — 83721 ASSAY OF BLOOD LIPOPROTEIN: CPT

## 2024-07-19 PROCEDURE — 80061 LIPID PANEL: CPT

## 2024-07-19 PROCEDURE — 87469 BABESIA MICROTI AMP PRB: CPT | Performed by: FAMILY MEDICINE

## 2024-07-19 PROCEDURE — 87484 EHRLICHA CHAFFEENSIS AMP PRB: CPT | Performed by: FAMILY MEDICINE

## 2024-07-19 PROCEDURE — 85025 COMPLETE CBC W/AUTO DIFF WBC: CPT

## 2024-07-19 PROCEDURE — 82607 VITAMIN B-12: CPT

## 2024-07-19 PROCEDURE — 82746 ASSAY OF FOLIC ACID SERUM: CPT

## 2024-07-19 NOTE — RESULT ENCOUNTER NOTE
Unstable labs - will review with patient at upcoming appointment.    Vitamin B12 Deficiency - s/p injections of Vitamin B12.  Start over-the-counter vitamin B12 1000mcg daily - take on an empty stomach.

## 2024-07-19 NOTE — RESULT ENCOUNTER NOTE
Stable labs - will review with patient at upcoming appointment.    Elevated MCV - Pending Vitamin B12.

## 2024-07-20 LAB
B BURGDOR IGG+IGM SER QL IA: NEGATIVE
HIV 1+2 AB+HIV1 P24 AG SERPL QL IA: NORMAL
HIV 2 AB SERPL QL IA: NORMAL
HIV1 AB SERPL QL IA: NORMAL
HIV1 P24 AG SERPL QL IA: NORMAL

## 2024-07-22 NOTE — RESULT ENCOUNTER NOTE
Unstable labs - will review with patient at upcoming appointment.    Hyperlipidemia - Recommend lifestyle modifications.    The 10-year ASCVD risk score (Mark COLE, et al., 2019) is: 1.2%    Values used to calculate the score:      Age: 58 years      Sex: Female      Is Non- : No      Diabetic: No      Tobacco smoker: No      Systolic Blood Pressure: 106 mmHg      Is BP treated: No      HDL Cholesterol: 75 mg/dL      Total Cholesterol: 209 mg/dL

## 2024-07-24 ENCOUNTER — RA CDI HCC (OUTPATIENT)
Dept: OTHER | Facility: HOSPITAL | Age: 59
End: 2024-07-24

## 2024-07-25 LAB — MISCELLANEOUS LAB TEST RESULT: NORMAL

## 2024-07-30 ENCOUNTER — OFFICE VISIT (OUTPATIENT)
Dept: FAMILY MEDICINE CLINIC | Facility: CLINIC | Age: 59
End: 2024-07-30
Payer: COMMERCIAL

## 2024-07-30 VITALS
OXYGEN SATURATION: 100 % | SYSTOLIC BLOOD PRESSURE: 118 MMHG | HEART RATE: 68 BPM | WEIGHT: 123.6 LBS | BODY MASS INDEX: 20.59 KG/M2 | RESPIRATION RATE: 16 BRPM | TEMPERATURE: 97.6 F | HEIGHT: 65 IN | DIASTOLIC BLOOD PRESSURE: 64 MMHG

## 2024-07-30 DIAGNOSIS — R33.9 INCOMPLETE BLADDER EMPTYING: ICD-10-CM

## 2024-07-30 DIAGNOSIS — J45.990 EXERCISE-INDUCED ASTHMA: ICD-10-CM

## 2024-07-30 DIAGNOSIS — Z13.1 SCREENING FOR DIABETES MELLITUS: ICD-10-CM

## 2024-07-30 DIAGNOSIS — R10.12 LUQ ABDOMINAL PAIN: ICD-10-CM

## 2024-07-30 DIAGNOSIS — D21.9 FIBROIDS: ICD-10-CM

## 2024-07-30 DIAGNOSIS — E78.5 HYPERLIPIDEMIA, UNSPECIFIED HYPERLIPIDEMIA TYPE: ICD-10-CM

## 2024-07-30 DIAGNOSIS — D72.820 LYMPHOCYTOSIS: ICD-10-CM

## 2024-07-30 DIAGNOSIS — J30.1 NON-SEASONAL ALLERGIC RHINITIS DUE TO POLLEN: ICD-10-CM

## 2024-07-30 DIAGNOSIS — R39.15 URINARY URGENCY: ICD-10-CM

## 2024-07-30 DIAGNOSIS — D36.9 DERMOID CYST: ICD-10-CM

## 2024-07-30 DIAGNOSIS — I77.9 LEFT-SIDED CAROTID ARTERY DISEASE, UNSPECIFIED TYPE (HCC): ICD-10-CM

## 2024-07-30 DIAGNOSIS — Z13.6 SCREENING FOR CARDIOVASCULAR CONDITION: ICD-10-CM

## 2024-07-30 DIAGNOSIS — Z12.31 BREAST CANCER SCREENING BY MAMMOGRAM: ICD-10-CM

## 2024-07-30 DIAGNOSIS — E53.8 VITAMIN B12 DEFICIENCY: ICD-10-CM

## 2024-07-30 DIAGNOSIS — Z86.010 HISTORY OF COLON POLYPS: ICD-10-CM

## 2024-07-30 DIAGNOSIS — R73.01 IFG (IMPAIRED FASTING GLUCOSE): Primary | ICD-10-CM

## 2024-07-30 DIAGNOSIS — S39.011A STRAIN OF ABDOMINAL MUSCLE, INITIAL ENCOUNTER: ICD-10-CM

## 2024-07-30 LAB
BACTERIA UR QL AUTO: NORMAL /HPF
BILIRUB UR QL STRIP: NEGATIVE
CLARITY UR: CLEAR
COLOR UR: COLORLESS
GLUCOSE UR STRIP-MCNC: NEGATIVE MG/DL
HGB UR QL STRIP.AUTO: NEGATIVE
KETONES UR STRIP-MCNC: NEGATIVE MG/DL
LEUKOCYTE ESTERASE UR QL STRIP: NEGATIVE
NITRITE UR QL STRIP: NEGATIVE
NON-SQ EPI CELLS URNS QL MICRO: NORMAL /HPF
PH UR STRIP.AUTO: 7 [PH]
PROT UR STRIP-MCNC: NEGATIVE MG/DL
RBC #/AREA URNS AUTO: NORMAL /HPF
SL AMB  POCT GLUCOSE, UA: NORMAL
SL AMB LEUKOCYTE ESTERASE,UA: NORMAL
SL AMB POCT BILIRUBIN,UA: NORMAL
SL AMB POCT BLOOD,UA: NORMAL
SL AMB POCT CLARITY,UA: CLEAR
SL AMB POCT COLOR,UA: NORMAL
SL AMB POCT KETONES,UA: NORMAL
SL AMB POCT NITRITE,UA: NORMAL
SL AMB POCT PH,UA: 7
SL AMB POCT SPECIFIC GRAVITY,UA: 1
SL AMB POCT URINE PROTEIN: NORMAL
SL AMB POCT UROBILINOGEN: NORMAL
SP GR UR STRIP.AUTO: 1.01 (ref 1–1.03)
UROBILINOGEN UR STRIP-ACNC: <2 MG/DL
WBC #/AREA URNS AUTO: NORMAL /HPF

## 2024-07-30 PROCEDURE — 81002 URINALYSIS NONAUTO W/O SCOPE: CPT | Performed by: FAMILY MEDICINE

## 2024-07-30 PROCEDURE — 99214 OFFICE O/P EST MOD 30 MIN: CPT | Performed by: FAMILY MEDICINE

## 2024-07-30 PROCEDURE — 87086 URINE CULTURE/COLONY COUNT: CPT | Performed by: FAMILY MEDICINE

## 2024-07-30 PROCEDURE — 81001 URINALYSIS AUTO W/SCOPE: CPT | Performed by: FAMILY MEDICINE

## 2024-07-30 NOTE — PROGRESS NOTES
Assessment/Plan:  Problem List Items Addressed This Visit          Cardiovascular and Mediastinum    Left-sided carotid artery disease (HCC)     Stable.  Repeat B/L Carotid U/S 2/27/25.              Respiratory    Exercise-induced asthma     Stable on Albuterol HFA PRN.  Patient will need pre and post spirometry in the future when COVID restrictions are lifted.         AR (allergic rhinitis)     Stable on Allegra.            Endocrine    IFG (impaired fasting glucose) - Primary     Stable.  Patient declines starting metformin  mg 3 pills daily and prediabetic education.  Recommend lifestyle modifications.           Relevant Orders    Comprehensive metabolic panel    Hemoglobin A1C       Other    Hyperlipidemia     Stable s statin.  Recommend lifestyle modifications.    The 10-year ASCVD risk score (Mark COLE, et al., 2019) is: 1.5%    Values used to calculate the score:      Age: 58 years      Sex: Female      Is Non- : No      Diabetic: No      Tobacco smoker: No      Systolic Blood Pressure: 118 mmHg      Is BP treated: No      HDL Cholesterol: 75 mg/dL      Total Cholesterol: 209 mg/dL           Relevant Orders    CBC and differential    Comprehensive metabolic panel    Lipid panel    TSH, 3rd generation with Free T4 reflex    LDL cholesterol, direct    Vitamin B12 deficiency     Low, but improving.  Increase Vitamin B12 1500mcg oral daily.           Relevant Orders    CBC and differential    Vitamin B12    Folate    Dermoid cyst     Management per Gyn.  Patient declines surgery.           Fibroids     Management per Gyn. Patient declines surgery.           History of colon polyps     Colonoscopy is up to date.  Patient declines further colonoscopy.            Other Visit Diagnoses       Incomplete bladder emptying        Relevant Orders    Ambulatory Referral to Urology    Urine culture (Completed)    Urinalysis with microscopic (Completed)    POCT urine dip (Completed)    Pending  labs.  May be due to fibroids.        Urinary urgency        Relevant Orders    Ambulatory Referral to Urology    Urine culture (Completed)    Urinalysis with microscopic (Completed)    POCT urine dip (Completed)    See above.        LUQ abdominal pain        Relevant Orders    Ambulatory Referral to Physical Therapy    F/U GI.  Consider PT for abdominal strain.        Strain of abdominal muscle, initial encounter        Relevant Orders    Ambulatory Referral to Physical Therapy    See above.        Screening for cardiovascular condition        Relevant Orders    CBC and differential    Comprehensive metabolic panel    Lipid panel    LDL cholesterol, direct    Screening for diabetes mellitus        Relevant Orders    Hemoglobin A1C    Lymphocytosis        Relevant Medications    cyanocobalamin (VITAMIN B-12) 1000 MCG tablet    Other Relevant Orders    CBC and differential    Pending labs.        Breast cancer screening by mammogram        Relevant Orders    Mammo screening bilateral w 3d & cad               Return in about 4 months (around 11/30/2024) for Physical / 4mo - IFG, HL, Asthma, Vit B12 Def, Labs.      No future appointments.       Subjective:     Jessica is a 58 y.o. female who presents today for a follow-up on her chronic medical conditions.        HPI:  Chief Complaint   Patient presents with   • Follow-up     4mo - IFG, HL, Asthma, Vit B12 Def, Labs. Pt continues to experience intermittent abd pain, slight improvement. ACT score 25.      -- Above per clinical staff and reviewed. --      HPI      Today:      Return in about 4 months (around 7/19/2024) for 4mo - IFG, HL, Asthma, Vit B12 Def, Labs.     Watching diet.  Exercise - Walking for 30 minutes, 3 times per week, Swimming.  Previously Hiking, biking, or gardening, yard work for 5-6 hours, 7 days per week.      IFG - No Pre-DM meds previously.       Hyperlipidemia - No statin previously.         AR - Stable on Allergra PRN.     Exercise induced Asthma -  Not currently using Albuterol HFA.  ACT on 25 7/30/24.       Dermoid Cyst / Fibroid Cysts - Sees Gyn Dr. Stephanie David in [place], [place].  Next appt 12/24.  Had serial transvaginal U/S.       Easy Bruising - Seen by Heme/Onc Dr. Cory Gallagher in [place], [place].  Stable, patient was cleared for Gyn surgery.  Patient states Heme/Onc not concerned with lab abnormalities 12/11/23 - elevated Ferritin 178 and Low Vitamin B12 405.     Right Carotid Artery Disease - Per [facility] 2/27/24.      Vitamin B12 Deficiency - She declines injections of Vitamin B12.  She will start over-the-counter vitamin B12 1000mcg daily - take on an empty stomach.         Dermatitis - Management per Derm Dr. Goode in [place], [place] - Next appt 7/24.  Stable on Clobetasol ointment 0.05% ointment BID PRN.       H/O Pre-Skin Cancer - Management per Yasmeen Collins at Dr. Goode's Derm office at [facility] in [place], [place].  Next appt 7/24.    H/O Colon Polyps - Management per [facility] Dr. Hinojosa.  Last colonoscopy 3/29/24 - repeat in 5-10 years - 3/29/29 - 3/29/34 - patient refuses further colonoscopy.       Fam Hx Colon, Prostate, and Pancreatic Cancer - She declines Genetics consult 3/7/24 due to privacy issues.          Reviewed:  Labs 7/19/24, GI 3/26/24     Sees Gyn Dr. Stephanie David in [place], [place].  Next appt 12/24..  Last Pap 2018.  Denies H/O Abnormal Paps.  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  She has not yet F/U c GI and refuses future colonoscopy.          The following portions of the patient's history were reviewed and updated as appropriate: allergies, current medications, past family history, past medical history, past social history, past surgical history and problem list.      Review of Systems   Constitutional:  Negative for appetite change, chills, diaphoresis, fatigue  "and fever.   Respiratory:  Negative for chest tightness and shortness of breath.    Cardiovascular:  Negative for chest pain.   Gastrointestinal:  Positive for abdominal pain (LUQ). Negative for blood in stool, diarrhea, nausea and vomiting.   Genitourinary:  Positive for urgency. Negative for dysuria.        Current Outpatient Medications   Medication Sig Dispense Refill   • albuterol (PROVENTIL HFA,VENTOLIN HFA) 90 mcg/act inhaler Inhale 2 puffs every 4 (four) hours as needed for shortness of breath 18 g 0   • Cholecalciferol (Vitamin D3) 25 MCG (1000 UT) capsule Take 1,000 Units by mouth daily     • clobetasol (TEMOVATE) 0.05 % ointment Apply 1 Application topically as needed (rash)     • cyanocobalamin (VITAMIN B-12) 1000 MCG tablet Take 1.5 tablets (1,500 mcg total) by mouth daily     • fexofenadine (ALLEGRA) 180 MG tablet Take 180 mg by mouth daily as needed       No current facility-administered medications for this visit.       Objective:  /64   Pulse 68   Temp 97.6 °F (36.4 °C)   Resp 16   Ht 5' 5\" (1.651 m)   Wt 56.1 kg (123 lb 9.6 oz)   LMP 01/01/2017 (Approximate)   SpO2 100%   BMI 20.57 kg/m²    Wt Readings from Last 3 Encounters:   07/30/24 56.1 kg (123 lb 9.6 oz)   04/30/24 55.3 kg (122 lb)   03/19/24 57.2 kg (126 lb)      BP Readings from Last 3 Encounters:   07/30/24 118/64   04/30/24 106/72   03/19/24 106/64          Physical Exam  Vitals and nursing note reviewed.   Constitutional:       General: She is not in acute distress.     Appearance: Normal appearance. She is well-developed and normal weight.   HENT:      Head: Normocephalic and atraumatic.   Eyes:      Conjunctiva/sclera: Conjunctivae normal.   Neck:      Thyroid: No thyromegaly.   Cardiovascular:      Rate and Rhythm: Normal rate and regular rhythm.      Pulses: Normal pulses.      Heart sounds: Normal heart sounds.   Pulmonary:      Effort: Pulmonary effort is normal.      Breath sounds: Normal breath sounds.   Abdominal: " "     General: Abdomen is flat. Bowel sounds are normal. There is no distension.      Palpations: Abdomen is soft. There is no mass.      Tenderness: There is abdominal tenderness (Mild) in the left upper quadrant. There is no right CVA tenderness, left CVA tenderness, guarding or rebound.   Musculoskeletal:         General: No swelling or tenderness.      Cervical back: Neck supple.      Right lower leg: No edema.      Left lower leg: No edema.   Lymphadenopathy:      Cervical: No cervical adenopathy.   Neurological:      General: No focal deficit present.      Mental Status: She is alert and oriented to person, place, and time.   Psychiatric:         Mood and Affect: Mood normal.         Lab Results:      Lab Results   Component Value Date    WBC 5.06 07/19/2024    HGB 13.5 07/19/2024    HCT 40.4 07/19/2024     07/19/2024    TRIG 58 07/19/2024    HDL 75 07/19/2024    LDLDIRECT 136 (H) 07/19/2024    ALT 15 07/19/2024    AST 17 07/19/2024    K 4.5 07/19/2024     07/19/2024    CREATININE 0.71 07/19/2024    BUN 17 07/19/2024    CO2 29 07/19/2024    GLUF 85 07/19/2024    HGBA1C 5.5 07/19/2024     No results found for: \"URICACID\"  Invalid input(s): \"BASENAME\" Vitamin D    No results found.     POCT Labs                       "

## 2024-08-01 LAB — BACTERIA UR CULT: NORMAL

## 2024-08-01 NOTE — RESULT ENCOUNTER NOTE
Urine culture is negative for urinary tract infection and shows normal bacteria from the GI, vaginal, and rectal tracts.    Message sent to patient via [employer] patient portal.

## 2024-08-01 NOTE — ASSESSMENT & PLAN NOTE
Stable s statin.  Recommend lifestyle modifications.    The 10-year ASCVD risk score (Mark COLE, et al., 2019) is: 1.5%    Values used to calculate the score:      Age: 58 years      Sex: Female      Is Non- : No      Diabetic: No      Tobacco smoker: No      Systolic Blood Pressure: 118 mmHg      Is BP treated: No      HDL Cholesterol: 75 mg/dL      Total Cholesterol: 209 mg/dL

## 2024-10-29 ENCOUNTER — OFFICE VISIT (OUTPATIENT)
Dept: FAMILY MEDICINE CLINIC | Facility: CLINIC | Age: 59
End: 2024-10-29
Payer: COMMERCIAL

## 2024-10-29 ENCOUNTER — TELEPHONE (OUTPATIENT)
Dept: FAMILY MEDICINE CLINIC | Facility: CLINIC | Age: 59
End: 2024-10-29

## 2024-10-29 VITALS
WEIGHT: 128.4 LBS | TEMPERATURE: 97.2 F | BODY MASS INDEX: 21.39 KG/M2 | DIASTOLIC BLOOD PRESSURE: 62 MMHG | HEART RATE: 77 BPM | RESPIRATION RATE: 14 BRPM | HEIGHT: 65 IN | SYSTOLIC BLOOD PRESSURE: 110 MMHG | OXYGEN SATURATION: 100 %

## 2024-10-29 DIAGNOSIS — Z78.0 POST-MENOPAUSAL: ICD-10-CM

## 2024-10-29 DIAGNOSIS — R73.01 IFG (IMPAIRED FASTING GLUCOSE): ICD-10-CM

## 2024-10-29 DIAGNOSIS — Z00.00 ANNUAL PHYSICAL EXAM: Primary | ICD-10-CM

## 2024-10-29 DIAGNOSIS — D31.91 NEVUS OF RIGHT EYE: ICD-10-CM

## 2024-10-29 DIAGNOSIS — D36.9 DERMOID CYST: ICD-10-CM

## 2024-10-29 PROCEDURE — 99214 OFFICE O/P EST MOD 30 MIN: CPT | Performed by: FAMILY MEDICINE

## 2024-10-29 PROCEDURE — 99396 PREV VISIT EST AGE 40-64: CPT | Performed by: FAMILY MEDICINE

## 2024-10-29 RX ORDER — CALCIUM/D3/MAG/K2/MIN/HERB 326 333-32 MG
4 TABLET ORAL DAILY
COMMUNITY

## 2024-10-29 RX ORDER — COLLAGEN, HYDROLYSATE (BOVINE) 100 %
1 POWDER (GRAM) MISCELLANEOUS DAILY
COMMUNITY

## 2024-10-29 NOTE — TELEPHONE ENCOUNTER
----- Message from Norma Sanchez MD sent at 10/28/2024 11:05 PM EDT -----  Regarding: self scheduled 10/28 for 10/29 annual PE- should switch this to PCP  Pt self- scheduled today for an annual PE with me tomorrow, but she should be following up with PCP for her annual PE.  She is also due for labs prior to her physical exam, which her PCP Dr Lundberg has ordered.

## 2024-10-29 NOTE — TELEPHONE ENCOUNTER
Spoke with pt and she wants to switch to Dr Sanchez. Advised Dr Sanchez of this and she said it is fine and she can get the lab work done after the visit.

## 2024-10-29 NOTE — PROGRESS NOTES
Adult Annual Physical  Name: Jessica Pat      : 1965      MRN: [id number]  Encounter Provider: Norma Sanchez MD  Encounter Date: 10/29/2024   Encounter department: [facility]    Assessment & Plan  Annual physical exam  Reviewed health maintenance/preventive care.  Discussed continued healthy diet and exercise  Reviewed appropriate vaccinations  Screening labwork is already ordered.   Continue routine gyn care and monitoring of dermoid cyst.       IFG (impaired fasting glucose)  Has labs ordered including cmp and A1C       Dermoid cyst  Being monitored by        Post-menopausal    Orders:    DXA bone density spine hip and pelvis; Future    Nevus of right eye  Nevus lower lid (R) followed by dermatology and also has a nevus in the eye (retinal?) for which she follows w ophtho          Immunizations and preventive care screenings were discussed with patient today. Appropriate education was printed on patient's after visit summary.    Counseling:  Routine dental care, immunizations, diet/exercise  Gyn care  Mammogram utd   Crc screening utd           History of Present Illness     Adult Annual Physical:  Patient presents for annual physical. Pt here for annual PE    She has increased protein, whole grains in her diet to help with blood sugar    Dentist noticed bone loss in her jaw  Using collagen, ocean algae supplement  Menopause age 52.  Hasn't had dexa scan    Dermoid cyst- follows w gyn  For now just monitoring, not planning for surgery at this point.  No pain from this.    Normal bms.  No blood.       Derm regularly- compound nevus on lower lid on R and sees ophtho for a nevus on the R optic nerve.   .     Diet and Physical Activity:  - Diet/Nutrition: well balanced diet. healthy fats, good fiber  - Exercise: 5-7 times a week on average.    Depression Screening:  - PHQ-2 Score: 0    General Health:  - Sleep: sleeps well.  - Hearing: normal hearing bilateral ears.  - Vision: most  "recent eye exam < 1 year ago and goes for regular eye exams.  - Dental: regular dental visits.    /GYN Health:  - Follows with GYN: yes.     Review of Systems      Objective     /62   Pulse 77   Temp (!) 97.2 °F (36.2 °C)   Resp 14   Ht 5' 5\" (1.651 m)   Wt 58.2 kg (128 lb 6.4 oz)   LMP 01/01/2017 (Approximate)   SpO2 100%   BMI 21.37 kg/m²     Physical Exam  Vitals and nursing note reviewed.   Constitutional:       General: She is not in acute distress.     Appearance: Normal appearance. She is well-developed. She is not ill-appearing.   HENT:      Head: Normocephalic and atraumatic.   Eyes:      Conjunctiva/sclera: Conjunctivae normal.   Neck:      Vascular: No carotid bruit.   Cardiovascular:      Rate and Rhythm: Normal rate and regular rhythm.      Heart sounds: No murmur heard.  Pulmonary:      Effort: Pulmonary effort is normal. No respiratory distress.      Breath sounds: Normal breath sounds.   Abdominal:      Palpations: Abdomen is soft.      Tenderness: There is abdominal tenderness (mild lower abd b/l, w/o guarding or rebound).   Musculoskeletal:      Cervical back: Neck supple.      Right lower leg: No edema.      Left lower leg: No edema.   Lymphadenopathy:      Cervical: No cervical adenopathy.   Skin:     General: Skin is warm and dry.   Neurological:      Mental Status: She is alert and oriented to person, place, and time.   Psychiatric:         Mood and Affect: Mood normal.         Behavior: Behavior normal.         "

## 2024-11-02 PROBLEM — D31.91: Status: ACTIVE | Noted: 2024-11-02

## 2024-11-02 NOTE — ASSESSMENT & PLAN NOTE
Nevus lower lid (R) followed by dermatology and also has a nevus in the eye (retinal?) for which she follows stu de [facility]

## 2024-11-22 ENCOUNTER — RESULTS FOLLOW-UP (OUTPATIENT)
Dept: FAMILY MEDICINE CLINIC | Facility: CLINIC | Age: 59
End: 2024-11-22

## 2024-11-22 ENCOUNTER — APPOINTMENT (OUTPATIENT)
Dept: LAB | Facility: CLINIC | Age: 59
End: 2024-11-22
Payer: COMMERCIAL

## 2024-11-22 DIAGNOSIS — Z13.1 SCREENING FOR DIABETES MELLITUS: ICD-10-CM

## 2024-11-22 DIAGNOSIS — D70.9 NEUTROPENIA, UNSPECIFIED TYPE (HCC): Primary | ICD-10-CM

## 2024-11-22 DIAGNOSIS — D72.820 LYMPHOCYTOSIS: ICD-10-CM

## 2024-11-22 DIAGNOSIS — Z13.6 SCREENING FOR CARDIOVASCULAR CONDITION: ICD-10-CM

## 2024-11-22 DIAGNOSIS — E78.5 HYPERLIPIDEMIA, UNSPECIFIED HYPERLIPIDEMIA TYPE: ICD-10-CM

## 2024-11-22 DIAGNOSIS — E53.8 VITAMIN B12 DEFICIENCY: ICD-10-CM

## 2024-11-22 DIAGNOSIS — R73.01 IFG (IMPAIRED FASTING GLUCOSE): ICD-10-CM

## 2024-11-22 LAB
ALBUMIN SERPL BCG-MCNC: 4.1 G/DL (ref 3.5–5)
ALP SERPL-CCNC: 60 U/L (ref 34–104)
ALT SERPL W P-5'-P-CCNC: 17 U/L (ref 7–52)
ANION GAP SERPL CALCULATED.3IONS-SCNC: 6 MMOL/L (ref 4–13)
AST SERPL W P-5'-P-CCNC: 15 U/L (ref 13–39)
BASOPHILS # BLD AUTO: 0.04 THOUSANDS/ÂΜL (ref 0–0.1)
BASOPHILS NFR BLD AUTO: 1 % (ref 0–1)
BILIRUB SERPL-MCNC: 0.65 MG/DL (ref 0.2–1)
BUN SERPL-MCNC: 14 MG/DL (ref 5–25)
CALCIUM SERPL-MCNC: 8.7 MG/DL (ref 8.4–10.2)
CHLORIDE SERPL-SCNC: 104 MMOL/L (ref 96–108)
CHOLEST SERPL-MCNC: 199 MG/DL (ref ?–200)
CO2 SERPL-SCNC: 30 MMOL/L (ref 21–32)
CREAT SERPL-MCNC: 0.68 MG/DL (ref 0.6–1.3)
EOSINOPHIL # BLD AUTO: 0.12 THOUSAND/ÂΜL (ref 0–0.61)
EOSINOPHIL NFR BLD AUTO: 3 % (ref 0–6)
ERYTHROCYTE [DISTWIDTH] IN BLOOD BY AUTOMATED COUNT: 12.9 % (ref 11.6–15.1)
EST. AVERAGE GLUCOSE BLD GHB EST-MCNC: 111 MG/DL
FOLATE SERPL-MCNC: 14.7 NG/ML
GFR SERPL CREATININE-BSD FRML MDRD: 97 ML/MIN/1.73SQ M
GLUCOSE P FAST SERPL-MCNC: 91 MG/DL (ref 65–99)
HBA1C MFR BLD: 5.5 %
HCT VFR BLD AUTO: 40.4 % (ref 34.8–46.1)
HDLC SERPL-MCNC: 63 MG/DL
HGB BLD-MCNC: 13.5 G/DL (ref 11.5–15.4)
IMM GRANULOCYTES # BLD AUTO: 0.01 THOUSAND/UL (ref 0–0.2)
IMM GRANULOCYTES NFR BLD AUTO: 0 % (ref 0–2)
LDLC SERPL CALC-MCNC: 123 MG/DL (ref 0–100)
LDLC SERPL DIRECT ASSAY-MCNC: 127 MG/DL (ref 0–100)
LYMPHOCYTES # BLD AUTO: 2.63 THOUSANDS/ÂΜL (ref 0.6–4.47)
LYMPHOCYTES NFR BLD AUTO: 57 % (ref 14–44)
MCH RBC QN AUTO: 34.1 PG (ref 26.8–34.3)
MCHC RBC AUTO-ENTMCNC: 33.4 G/DL (ref 31.4–37.4)
MCV RBC AUTO: 102 FL (ref 82–98)
MONOCYTES # BLD AUTO: 0.39 THOUSAND/ÂΜL (ref 0.17–1.22)
MONOCYTES NFR BLD AUTO: 9 % (ref 4–12)
NEUTROPHILS # BLD AUTO: 1.34 THOUSANDS/ÂΜL (ref 1.85–7.62)
NEUTS SEG NFR BLD AUTO: 30 % (ref 43–75)
NONHDLC SERPL-MCNC: 136 MG/DL
NRBC BLD AUTO-RTO: 0 /100 WBCS
PLATELET # BLD AUTO: 190 THOUSANDS/UL (ref 149–390)
PMV BLD AUTO: 11.9 FL (ref 8.9–12.7)
POTASSIUM SERPL-SCNC: 4 MMOL/L (ref 3.5–5.3)
PROT SERPL-MCNC: 6.5 G/DL (ref 6.4–8.4)
RBC # BLD AUTO: 3.96 MILLION/UL (ref 3.81–5.12)
SODIUM SERPL-SCNC: 140 MMOL/L (ref 135–147)
TRIGL SERPL-MCNC: 63 MG/DL (ref ?–150)
TSH SERPL DL<=0.05 MIU/L-ACNC: 3.09 UIU/ML (ref 0.45–4.5)
VIT B12 SERPL-MCNC: 1038 PG/ML (ref 180–914)
WBC # BLD AUTO: 4.53 THOUSAND/UL (ref 4.31–10.16)

## 2024-11-22 PROCEDURE — 85025 COMPLETE CBC W/AUTO DIFF WBC: CPT

## 2024-11-22 PROCEDURE — 80053 COMPREHEN METABOLIC PANEL: CPT

## 2024-11-22 PROCEDURE — 83721 ASSAY OF BLOOD LIPOPROTEIN: CPT

## 2024-11-22 PROCEDURE — 82607 VITAMIN B-12: CPT

## 2024-11-22 PROCEDURE — 84443 ASSAY THYROID STIM HORMONE: CPT

## 2024-11-22 PROCEDURE — 83036 HEMOGLOBIN GLYCOSYLATED A1C: CPT

## 2024-11-22 PROCEDURE — 82746 ASSAY OF FOLIC ACID SERUM: CPT

## 2024-11-22 PROCEDURE — 80061 LIPID PANEL: CPT

## 2024-11-22 PROCEDURE — 36415 COLL VENOUS BLD VENIPUNCTURE: CPT

## 2024-11-22 NOTE — RESULT ENCOUNTER NOTE
Forwarded to PCP Dr. Sanchez for management.     Message sent to patient via "[employer]" patient portal.

## 2024-11-22 NOTE — RESULT ENCOUNTER NOTE
Forwarded to PCP Dr. Sanchez for management.      Message sent to patient via [employer] patient portal.

## 2025-02-27 ENCOUNTER — RESULTS FOLLOW-UP (OUTPATIENT)
Dept: FAMILY MEDICINE CLINIC | Facility: CLINIC | Age: 60
End: 2025-02-27

## 2025-02-27 ENCOUNTER — APPOINTMENT (OUTPATIENT)
Dept: LAB | Facility: CLINIC | Age: 60
End: 2025-02-27
Payer: COMMERCIAL

## 2025-02-27 DIAGNOSIS — D70.9 NEUTROPENIA, UNSPECIFIED TYPE (HCC): ICD-10-CM

## 2025-02-27 DIAGNOSIS — D70.9 NEUTROPENIA, UNSPECIFIED TYPE (HCC): Primary | ICD-10-CM

## 2025-02-27 LAB
BASOPHILS # BLD AUTO: 0.04 THOUSANDS/ÂΜL (ref 0–0.1)
BASOPHILS NFR BLD AUTO: 1 % (ref 0–1)
EOSINOPHIL # BLD AUTO: 0.14 THOUSAND/ÂΜL (ref 0–0.61)
EOSINOPHIL NFR BLD AUTO: 4 % (ref 0–6)
ERYTHROCYTE [DISTWIDTH] IN BLOOD BY AUTOMATED COUNT: 13.2 % (ref 11.6–15.1)
HCT VFR BLD AUTO: 41.8 % (ref 34.8–46.1)
HGB BLD-MCNC: 13.9 G/DL (ref 11.5–15.4)
IMM GRANULOCYTES # BLD AUTO: 0 THOUSAND/UL (ref 0–0.2)
IMM GRANULOCYTES NFR BLD AUTO: 0 % (ref 0–2)
LYMPHOCYTES # BLD AUTO: 2.45 THOUSANDS/ÂΜL (ref 0.6–4.47)
LYMPHOCYTES NFR BLD AUTO: 60 % (ref 14–44)
MCH RBC QN AUTO: 33.3 PG (ref 26.8–34.3)
MCHC RBC AUTO-ENTMCNC: 33.3 G/DL (ref 31.4–37.4)
MCV RBC AUTO: 100 FL (ref 82–98)
MONOCYTES # BLD AUTO: 0.33 THOUSAND/ÂΜL (ref 0.17–1.22)
MONOCYTES NFR BLD AUTO: 8 % (ref 4–12)
NEUTROPHILS # BLD AUTO: 1.07 THOUSANDS/ÂΜL (ref 1.85–7.62)
NEUTS SEG NFR BLD AUTO: 27 % (ref 43–75)
NRBC BLD AUTO-RTO: 0 /100 WBCS
PLATELET # BLD AUTO: 171 THOUSANDS/UL (ref 149–390)
PMV BLD AUTO: 12.9 FL (ref 8.9–12.7)
RBC # BLD AUTO: 4.18 MILLION/UL (ref 3.81–5.12)
WBC # BLD AUTO: 4.03 THOUSAND/UL (ref 4.31–10.16)

## 2025-02-27 PROCEDURE — 85025 COMPLETE CBC W/AUTO DIFF WBC: CPT

## 2025-02-27 PROCEDURE — 36415 COLL VENOUS BLD VENIPUNCTURE: CPT

## 2025-03-01 ENCOUNTER — HOSPITAL ENCOUNTER (OUTPATIENT)
Dept: VASCULAR ULTRASOUND | Facility: HOSPITAL | Age: 60
Discharge: HOME | End: 2025-03-01
Payer: COMMERCIAL

## 2025-03-01 DIAGNOSIS — I77.9 LEFT-SIDED CAROTID ARTERY DISEASE, UNSPECIFIED TYPE (HCC): ICD-10-CM

## 2025-03-01 PROCEDURE — 93880 EXTRACRANIAL BILAT STUDY: CPT

## 2025-03-01 PROCEDURE — 93880 EXTRACRANIAL BILAT STUDY: CPT | Performed by: SURGERY

## 2025-03-02 ENCOUNTER — RESULTS FOLLOW-UP (OUTPATIENT)
Dept: FAMILY MEDICINE CLINIC | Facility: CLINIC | Age: 60
End: 2025-03-02

## 2025-03-02 NOTE — RESULT ENCOUNTER NOTE
Forwarded to PCP Dr. Sanchez for further management.      Message sent to patient via [employer] patient portal.

## 2025-04-22 ENCOUNTER — APPOINTMENT (OUTPATIENT)
Dept: LAB | Facility: CLINIC | Age: 60
End: 2025-04-22
Payer: COMMERCIAL

## 2025-04-22 DIAGNOSIS — D70.9 NEUTROPENIA, UNSPECIFIED TYPE (HCC): ICD-10-CM

## 2025-04-22 LAB
BASOPHILS # BLD AUTO: 0.04 THOUSANDS/ÂΜL (ref 0–0.1)
BASOPHILS NFR BLD AUTO: 1 % (ref 0–1)
EOSINOPHIL # BLD AUTO: 0.15 THOUSAND/ÂΜL (ref 0–0.61)
EOSINOPHIL NFR BLD AUTO: 3 % (ref 0–6)
ERYTHROCYTE [DISTWIDTH] IN BLOOD BY AUTOMATED COUNT: 13.6 % (ref 11.6–15.1)
FOLATE SERPL-MCNC: 16.5 NG/ML
HCT VFR BLD AUTO: 39.1 % (ref 34.8–46.1)
HGB BLD-MCNC: 13.4 G/DL (ref 11.5–15.4)
IMM GRANULOCYTES # BLD AUTO: 0 THOUSAND/UL (ref 0–0.2)
IMM GRANULOCYTES NFR BLD AUTO: 0 % (ref 0–2)
LYMPHOCYTES # BLD AUTO: 2.2 THOUSANDS/ÂΜL (ref 0.6–4.47)
LYMPHOCYTES NFR BLD AUTO: 48 % (ref 14–44)
MCH RBC QN AUTO: 34.5 PG (ref 26.8–34.3)
MCHC RBC AUTO-ENTMCNC: 34.3 G/DL (ref 31.4–37.4)
MCV RBC AUTO: 101 FL (ref 82–98)
MONOCYTES # BLD AUTO: 0.44 THOUSAND/ÂΜL (ref 0.17–1.22)
MONOCYTES NFR BLD AUTO: 10 % (ref 4–12)
NEUTROPHILS # BLD AUTO: 1.71 THOUSANDS/ÂΜL (ref 1.85–7.62)
NEUTS SEG NFR BLD AUTO: 38 % (ref 43–75)
NRBC BLD AUTO-RTO: 0 /100 WBCS
PLATELET # BLD AUTO: 174 THOUSANDS/UL (ref 149–390)
PMV BLD AUTO: 11.9 FL (ref 8.9–12.7)
RBC # BLD AUTO: 3.88 MILLION/UL (ref 3.81–5.12)
VIT B12 SERPL-MCNC: 486 PG/ML (ref 180–914)
WBC # BLD AUTO: 4.54 THOUSAND/UL (ref 4.31–10.16)

## 2025-04-22 PROCEDURE — 85025 COMPLETE CBC W/AUTO DIFF WBC: CPT

## 2025-04-22 PROCEDURE — 82607 VITAMIN B-12: CPT

## 2025-04-22 PROCEDURE — 86225 DNA ANTIBODY NATIVE: CPT

## 2025-04-22 PROCEDURE — 36415 COLL VENOUS BLD VENIPUNCTURE: CPT

## 2025-04-22 PROCEDURE — 86038 ANTINUCLEAR ANTIBODIES: CPT

## 2025-04-22 PROCEDURE — 82746 ASSAY OF FOLIC ACID SERUM: CPT

## 2025-04-23 ENCOUNTER — RA CDI HCC (OUTPATIENT)
Dept: OTHER | Facility: HOSPITAL | Age: 60
End: 2025-04-23

## 2025-04-23 ENCOUNTER — RESULTS FOLLOW-UP (OUTPATIENT)
Dept: FAMILY MEDICINE CLINIC | Facility: CLINIC | Age: 60
End: 2025-04-23

## 2025-04-23 LAB
DSDNA IGG SERPL IA-ACNC: 0.9 IU/ML (ref ?–15)
NUCLEAR IGG SER IA-RTO: 0.2 RATIO (ref ?–1)

## 2025-04-29 ENCOUNTER — OFFICE VISIT (OUTPATIENT)
Dept: FAMILY MEDICINE CLINIC | Facility: CLINIC | Age: 60
End: 2025-04-29
Payer: COMMERCIAL

## 2025-04-29 VITALS
DIASTOLIC BLOOD PRESSURE: 58 MMHG | OXYGEN SATURATION: 98 % | TEMPERATURE: 97.4 F | HEIGHT: 65 IN | WEIGHT: 133 LBS | HEART RATE: 54 BPM | SYSTOLIC BLOOD PRESSURE: 94 MMHG | BODY MASS INDEX: 22.16 KG/M2

## 2025-04-29 DIAGNOSIS — D70.9 NEUTROPENIA, UNSPECIFIED TYPE (HCC): ICD-10-CM

## 2025-04-29 DIAGNOSIS — G89.29 CHRONIC PAIN OF LEFT KNEE: ICD-10-CM

## 2025-04-29 DIAGNOSIS — L29.89 CHRONIC PRURITIC RASH IN ADULT: ICD-10-CM

## 2025-04-29 DIAGNOSIS — M25.552 LEFT HIP PAIN: Primary | ICD-10-CM

## 2025-04-29 DIAGNOSIS — J45.990 EXERCISE-INDUCED ASTHMA: ICD-10-CM

## 2025-04-29 DIAGNOSIS — E53.8 VITAMIN B12 DEFICIENCY: ICD-10-CM

## 2025-04-29 DIAGNOSIS — D36.9 DERMOID CYST: ICD-10-CM

## 2025-04-29 DIAGNOSIS — M25.562 CHRONIC PAIN OF LEFT KNEE: ICD-10-CM

## 2025-04-29 PROCEDURE — 99214 OFFICE O/P EST MOD 30 MIN: CPT | Performed by: FAMILY MEDICINE

## 2025-04-29 RX ORDER — ESTRADIOL 0.1 MG/G
2 CREAM VAGINAL DAILY
COMMUNITY

## 2025-04-29 NOTE — PROGRESS NOTES
Assessment & Plan  Left hip pain  - Pain exacerbated by hip flexion and sitting, not aggravated by pressure on hip  - Referral to physical therapy for assessment and home exercise program  - Further evaluation by sports medicine specialist if necessary  Orders:  •  Ambulatory Referral to Physical Therapy; Future    Chronic pain of left knee  - Pain triggered by running and kneeling  - Referral to physical therapy for assessment and home exercise program  - Knee sleeve provides some relief  Orders:  •  Ambulatory Referral to Physical Therapy; Future    Chronic pruritic rash in adult  - Possible plant-related cause due to occupation involving outdoor activities  - Potential multiple allergies; referral to allergist for further evaluation  Orders:  •  Ambulatory referral to Allergy; Future    Vitamin B12 deficiency  Check level  Orders:  •  CBC and differential; Future  •  Vitamin B12; Future    Neutropenia, unspecified type (HCC)  - Repeat blood work in 3 months  Overall stable  Iron wnl  Blanca negative    Orders:  •  CBC and differential; Future    Dermoid cyst  Follows with gyn       Exercise-induced asthma  Albuterol prn                    Subjective:     Jessica is a 59 y.o. female here today and has the below chronic conditions:    Patient Active Problem List   Diagnosis   • Exercise-induced asthma   • Dermoid cyst   • Fibroids   • AR (allergic rhinitis)   • IFG (impaired fasting glucose)   • Hyperlipidemia   • Vitamin B12 deficiency   • Left-sided carotid artery disease (HCC)   • History of colon polyps   • Nevus of right eye     Current Outpatient Medications   Medication Sig Dispense Refill   • Collagen Hydrolysate POWD Use 1 Scoop in the morning     • cyanocobalamin (VITAMIN B-12) 1000 MCG tablet Take 1 tablet (1,000 mcg total) by mouth daily     • estradiol (ESTRACE) 0.1 mg/g vaginal cream Insert 2 g into the vagina daily     • fexofenadine (ALLEGRA) 180 MG tablet Take 180 mg by mouth daily as needed     •  Multiple Vitamins-Minerals (Algae Based Calcium) TABS Take 4 tablets by mouth in the morning     • albuterol (PROVENTIL HFA,VENTOLIN HFA) 90 mcg/act inhaler Inhale 2 puffs every 4 (four) hours as needed for shortness of breath (Patient not taking: Reported on 4/29/2025) 18 g 0     No current facility-administered medications for this visit.          Chief Complaint   Patient presents with   • Follow-up     Labs review     HPI:  - CC above per clinical staff and reviewed.    History of Present Illness  The patient presents for evaluation of rash, left hip pain, and left knee pain and to review labs.    Rash  - Intermittent rashes have been experienced since the spring, attributed to frequent outdoor activities and exposure to various plants.  - The rash typically manifests on the arms and hands before spreading to the legs and body.  - A potential allergy to plastic is suspected, as an incident involving a plastic chair resulted in a rash.  - The rashes are itchy and disrupt sleep.  - Consultation with an allergist desired  - Self-management has included the use of calamine lotion, which provides some relief.  - Steroids and fexofenadine have proven ineffective.  - No respiratory symptoms such as wheezing   - The rashes tend to subside during the winter months, despite continued outdoor activities.    Left Hip Pain  - Persistent left hip pain is described as a springy sensation.  - Despite attempts at self-directed physical therapy and exercise, discomfort continues.  - This issue has been ongoing for approximately 1.5 years.  - The pain is exacerbated by leg lifting and sitting but is not aggravated by pressure on the hip.  - Full range of motion in the hip is maintained.    Left Knee Pain  - Left knee pain is also reported, triggered by running and kneeling.  - Relief is found from wearing a knee sleeve.    Supplemental information: A dermoid cyst is present, and her GYN wants her to rescan it after half a year due  "to concerns about growth. All bladder problems have been resolved through pelvic floor exercises that she has been doing.  She will contact gyn for f/u.    The following portions of the patient's history were reviewed and updated as appropriate: allergies, current medications, past family history, past medical history, past social history, past surgical history and problem list.    ROS:  Review of Systems   As noted above.    Objective:      BP 94/58   Pulse (!) 54   Temp (!) 97.4 °F (36.3 °C)   Ht 5' 5\" (1.651 m)   Wt 60.3 kg (133 lb)   LMP 01/01/2017 (Approximate)   SpO2 98%   BMI 22.13 kg/m²   BP Readings from Last 3 Encounters:   04/29/25 94/58   10/29/24 110/62   07/30/24 118/64     Wt Readings from Last 3 Encounters:   04/29/25 60.3 kg (133 lb)   10/29/24 58.2 kg (128 lb 6.4 oz)   07/30/24 56.1 kg (123 lb 9.6 oz)               Physical Exam:   Physical Exam  Vitals and nursing note reviewed.   Constitutional:       Appearance: Normal appearance. She is not ill-appearing.   Neck:      Vascular: No carotid bruit.   Cardiovascular:      Rate and Rhythm: Normal rate and regular rhythm.   Pulmonary:      Effort: Pulmonary effort is normal.      Breath sounds: Normal breath sounds.   Musculoskeletal:      Right lower leg: No edema.      Left lower leg: No edema.      Comments: Normal ROM L hip and L knee  Mild anterior L hip tenderness  5/5 strength prox and distal LE   Lymphadenopathy:      Cervical: No cervical adenopathy.   Neurological:      Mental Status: She is alert and oriented to person, place, and time.      Gait: Gait normal.   Psychiatric:         Mood and Affect: Mood normal.         Behavior: Behavior normal.                  This office visit note was generated in part with the use of [employer] and/or voice recognition dictation.     "

## 2025-05-15 ENCOUNTER — EVALUATION (OUTPATIENT)
Dept: PHYSICAL THERAPY | Facility: CLINIC | Age: 60
End: 2025-05-15
Attending: FAMILY MEDICINE
Payer: COMMERCIAL

## 2025-05-15 DIAGNOSIS — M25.552 LEFT HIP PAIN: Primary | ICD-10-CM

## 2025-05-15 DIAGNOSIS — M25.562 CHRONIC PAIN OF LEFT KNEE: ICD-10-CM

## 2025-05-15 DIAGNOSIS — G89.29 CHRONIC PAIN OF LEFT KNEE: ICD-10-CM

## 2025-05-15 PROCEDURE — 97161 PT EVAL LOW COMPLEX 20 MIN: CPT

## 2025-05-15 PROCEDURE — 97110 THERAPEUTIC EXERCISES: CPT

## 2025-05-15 NOTE — PROGRESS NOTES
PT Evaluation     Today's date: 5/15/2025  Patient name: Jessica Pat  : 1965  MRN: [id number]  Referring provider: Norma Sanchez MD  Dx:   Encounter Diagnosis     ICD-10-CM    1. Left hip pain  M25.552       2. Chronic pain of left knee  M25.562     G89.29                      Assessment  Impairments: abnormal gait, abnormal muscle firing, abnormal or restricted ROM, activity intolerance, impaired physical strength, lacks appropriate home exercise program, pain with function and poor body mechanics    Assessment details: Jessica Pat is a 59 y.o. female who presents to hospital-based outpatient PT with chronic left hip and left knee pain. Patient presents with the following impairments: left hip and left knee pain, limited left hip and knee ROM, limited left hip and knee strength, and increased tissue tension of left hip flexors and quads. Due to these impairments, patient has difficulty performing the following: ascending stairs, walking on uneven terrain, hiking, bicycling, and lifting the left leg while standing. Patient has been educated in home exercise program and plan of care. Patient would benefit from skilled physical therapy services to address the above functional limitations and progress towards prior level of function and independence with home exercise program.  Understanding of Dx/Px/POC: good     Prognosis: good    Goals  Short Term Goals [3 weeks; target date: 25]  1. Patient will be independent with initial HEP.  2. Patient will present with improved left hip flexion AROM of 5 degrees.  3. Patient will demonstrate an increase in left hip strength of 1/2 grade on MMT.    Long Term Goals [6 weeks; target date: 8/15/25]  1. Patient will demonstrate an increase in left hip AROM to WNL in order to promote self-care activities pain-free.  2. Patient will demonstrate an increase in left hip/knee strength of 1 grade on MMT in order to promote improved stair ambulation.  3. Patient will be able  to hike for 1 hour without left hip/knee pain.   4. Patient will be able to ascend/descend 10 stairs reciprocally without pain.    Plan  Patient would benefit from: skilled physical therapy  Planned modality interventions: cryotherapy, electrical stimulation/Russian stimulation, TENS, ultrasound, thermotherapy: hydrocollator packs, unattended electrical stimulation, high voltage pulsed current: pain management and high voltage pulsed current: spasm management    Planned therapy interventions: flexibility, functional ROM exercises, graded exercise, home exercise program, joint mobilization, manual therapy, neuromuscular re-education, patient education, strengthening, stretching, therapeutic exercise, therapeutic activities, Edmond taping, balance/weight bearing training, gait training, abdominal trunk stabilization, activity modification, balance, body mechanics training, graded activity, self care, postural training, IADL retraining, ADL training, breathing training, behavior modification, muscle pump exercises, therapeutic training and transfer training    Frequency: 2x week  Plan of Care beginning date: 5/15/2025  Plan of Care expiration date: 8/15/2025  Treatment plan discussed with: patient    Subjective Evaluation    History of Present Illness  Mechanism of injury: October 2023, had left knee pain and then mowed the lawn and weedwacking. Woke up with pain the next day and pain with applying weight for 10-12 days. Began improving and returned to activity. Pt states that during that [place], her daughter tore her meniscus. Pt states she has been able to return to running, but has had left hip pain. Pt states that resting helped her left hip, but not her knee. Pt states that she has medial knee pain and left hip flexor pain. Pt states her knee hurts with hiking and walking on uneven ground. Pt states that going up the stairs is painful. Pt states she has also had pain in the back of the knee, which is also  worse with hiking. Pt states her knee feels worst with bicycling. Pt denies any knee buckling episodes.   Patient Goals  Patient goals for therapy: decreased pain and return to sport/leisure activities    Pain  Current pain ratin  At best pain ratin  At worst pain ratin  Location: Left hip (Left knee C: 1, B: 0, W: 1)  Quality: dull ache and sharp (Dull in hip, dull/sharp in knee)  Alleviating factors: Hip improves with rest, knee does not.  Progression: no change    Social Support  Steps to enter house: no  Stairs in house: yes (Spiral staircase)   10  Lives with: spouse    Employment status: not working  Hand dominance: right  Exercise history: Every day - hiking, walking, running, planks, push ups      Diagnostic Tests  No diagnostic tests performed  Treatments  Treatments tried: Home exercises.      Objective     Active Range of Motion   Left Knee   Flexion: 132 degrees   Extension: 0 degrees     Right Knee   Flexion: 138 degrees   Extension: 0 degrees     Passive Range of Motion   Left Hip   Flexion: 120 degrees with pain  Abduction: 62 degrees   External rotation (90/90): 32 degrees   Internal rotation (90/90): 22 degrees     Right Hip   Flexion: 128 degrees   Abduction: 65 degrees   External rotation (90/90): 48 degrees   Internal rotation (90/90): 25 degrees     Strength/Myotome Testing     Left Hip   Planes of Motion   Flexion: 4+  Abduction: 4  Adduction: 4+    Right Hip   Planes of Motion   Flexion: 5  Abduction: 4  Adduction: 4+    Left Knee   Flexion: 4  Extension: 4+  Quadriceps contraction: fair    Right Knee   Flexion: 5  Extension: 5  Quadriceps contraction: good    Additional Strength Details  Notes left hip flexor tenderness with SLR on L     General Comments:      Knee Comments  Flexibility - moderate limitation in left iliopsoas/quads; WNL in right            Daily Treatment Diary     Precautions: Standard      POC Expires Reeval for Medicare to be completed  Unit Limit Auth  Expiration Date PT/OT/STVisit Limit   8/15/25 N/A 4  12/31/25 N/A                       Auth Status DATE 5/15        Approved Visit # 1                  MANUAL THERAPY         STM/MFR         Joint mobs         Manual flexibility                                    THERAPEUTIC EXERCISE HEP         PROM L hip/knee          Bike          Hip flexor stretch x 1x30s         Quad stretch x 1x30s                                                                                                                       NEUROMUSCULAR REEDUCATION           Quad sets          SLR x 10x         Sidelying hip abd          Clamshells          Bridging                                                                                                    THERAPEUTIC ACTIVITY                                                  GAIT TRAINING                                                  MODALITIES                               Access Code: [email or web address]  URL: [email or web address]  Date: 05/15/2025  Prepared by: Nima Murray    Exercises  - Supine Quadriceps Stretch with Strap on Table  - 1 x daily - 7 x weekly - 1 sets - 3 reps - 30 hold  - Quadriceps Stretch with Chair  - 1 x daily - 7 x weekly - 1 sets - 3 reps - 30 hold  - Active Straight Leg Raise with Quad Set  - 1 x daily - 7 x weekly - 2 sets - 10 reps - 1 hold

## 2025-05-20 ENCOUNTER — OFFICE VISIT (OUTPATIENT)
Dept: PHYSICAL THERAPY | Facility: CLINIC | Age: 60
End: 2025-05-20
Attending: FAMILY MEDICINE
Payer: COMMERCIAL

## 2025-05-20 DIAGNOSIS — M25.562 CHRONIC PAIN OF LEFT KNEE: ICD-10-CM

## 2025-05-20 DIAGNOSIS — M25.552 LEFT HIP PAIN: Primary | ICD-10-CM

## 2025-05-20 DIAGNOSIS — G89.29 CHRONIC PAIN OF LEFT KNEE: ICD-10-CM

## 2025-05-20 PROCEDURE — 97112 NEUROMUSCULAR REEDUCATION: CPT

## 2025-05-20 PROCEDURE — 97110 THERAPEUTIC EXERCISES: CPT

## 2025-05-20 NOTE — PROGRESS NOTES
Daily Note      Today's date: 2025  Patient name: Jessica Pat  : 1965  MRN: [id number]  Referring provider: Norma Sanchez MD  Dx:   Encounter Diagnosis     ICD-10-CM    1. Left hip pain  M25.552       2. Chronic pain of left knee  M25.562     G89.29           Subjective: Pt reports that she had increased left knee pain and swelling from her stretches in her HEP. Pt states that she changed up her exercises to include more dynamic stretching, in place of static stretching. Pt states that hip flexor stretch seems to be the one that irritated left knee pain. Pt states she reviewed her evaluation plan and saw clamshells and bridges in her plan and states that she has already been able to do these at home without any issue.        Objective: See treatment diary below    Assessment: Pt tolerated treatment well. Initiated modified stretching for HEP, due to pain with supine hip flexor stretch and standing quad stretch. Pt performed hip flexor stretching in standing and quad stretch in prone and tolerated well. Pt was utilizing contraction of agonist muscle groups during stretches, which may have contributed to poor response to HEP. Educated pt regarding relaxation of the entire leg during stretches and pt responded more favorably. Pt demonstrated good understanding of this. Reviewed form with clamshells and bridging and added focus on squeezing heels together. Pt also initiated bike warmup and noted more fatigue in the left quads compared to right and also noted this more during standing hip flexor stretch with rear foot elevated when LLE was in front.     Plan: Continue per plan of care.  Progress strengthening next visit as able.         Daily Treatment Diary     Precautions: Standard      POC Expires Reeval for Medicare to be completed  Unit Limit Auth Expiration Date PT/OT/STVisit Limit   8/15/25 N/A 4  25 N/A                       Auth Status DATE 5/15 5/20       Approved Visit # 1 2                  MANUAL THERAPY         STM/MFR         Joint mobs         Manual flexibility                                    THERAPEUTIC EXERCISE HEP           PROM L hip/knee          Bike   5' L1        Hip flexor stretch x 1x30s  3x15s w/ rear foot elevated on chair       Quad stretch x 1x30s  Prone 3x15s w/ SOS       HS stretch    3x15s B/L                                                                                                  Pt education   Review of HEP; relaxation during stretches       NEUROMUSCULAR REEDUCATION           Quad sets          SLR x 10x  2x10 LLE       Sidelying hip abd          Clamshells   Reviewed       Bridging   Reviewed       Prone hip ext w/ bent knee   10x B/L                                                                                        THERAPEUTIC ACTIVITY                                                  GAIT TRAINING                                                  MODALITIES                               Access Code: [email or web address]  URL: [email or web address]  Date: 05/15/2025  Prepared by: Nima Murray    Exercises  - Supine Quadriceps Stretch with Strap on Table  - 1 x daily - 7 x weekly - 1 sets - 3 reps - 30 hold  - Quadriceps Stretch with Chair  - 1 x daily - 7 x weekly - 1 sets - 3 reps - 30 hold  - Active Straight Leg Raise with Quad Set  - 1 x daily - 7 x weekly - 2 sets - 10 reps - 1 hold

## 2025-05-22 ENCOUNTER — APPOINTMENT (OUTPATIENT)
Dept: PHYSICAL THERAPY | Facility: CLINIC | Age: 60
End: 2025-05-22
Attending: FAMILY MEDICINE
Payer: COMMERCIAL

## 2025-05-27 ENCOUNTER — OFFICE VISIT (OUTPATIENT)
Dept: PHYSICAL THERAPY | Facility: CLINIC | Age: 60
End: 2025-05-27
Attending: FAMILY MEDICINE
Payer: COMMERCIAL

## 2025-05-27 DIAGNOSIS — G89.29 CHRONIC PAIN OF LEFT KNEE: ICD-10-CM

## 2025-05-27 DIAGNOSIS — M25.562 CHRONIC PAIN OF LEFT KNEE: ICD-10-CM

## 2025-05-27 DIAGNOSIS — M25.552 LEFT HIP PAIN: Primary | ICD-10-CM

## 2025-05-27 PROCEDURE — 97112 NEUROMUSCULAR REEDUCATION: CPT

## 2025-05-27 PROCEDURE — 97110 THERAPEUTIC EXERCISES: CPT

## 2025-05-27 NOTE — PROGRESS NOTES
Daily Note     Today's date: 2025  Patient name: Jessica Pat  : 1965  MRN: [id number]  Referring provider: Norma Sanchez MD  Dx:   Encounter Diagnosis     ICD-10-CM    1. Left hip pain  M25.552       2. Chronic pain of left knee  M25.562     G89.29           Start Time: 0732  Stop Time: 0816  Total time in clinic (min): 44 minutes    Subjective: she reports having symptoms with leg hang stretch after performing it. She also reports that she went biking with her daughter and her knee didn't like that.       Objective: See treatment diary below      Assessment: Patient presented with low symptom irritability but does note symptoms in L LE. Heel slides added and she had better tolerance with slides when in slight ER position in hip it seemed. ER stretch added via figure-four stretch in hook-lying. Quad set emphasized for quadriceps contraction without gluteal compensation. She was able to perform gluteal activation exercises well without symptoms noted. Tolerated treatment well. Patient would benefit from continued PT      Plan: Continue per plan of care.      Daily Treatment Diary     Precautions: Standard      POC Expires Reeval for Medicare to be completed  Unit Limit Auth Expiration Date PT/OT/STVisit Limit   8/15/25 N/A 4  25 N/A                       Auth Status DATE 5/15 5/20 5/27      Approved Visit # 1 2 3                MANUAL THERAPY         STM/MFR         Joint mobs         Manual flexibility                                    THERAPEUTIC EXERCISE HEP           PROM L hip/knee          Bike   5' L1  6' L1      Hip flexor stretch x 1x30s  3x15s w/ rear foot elevated on chair       Quad stretch x 1x30s  Prone 3x15s w/ SOS Prone 3x15s w/ SOS      HS stretch    3x15s B/L  3x15s B/L with SOS      Heel Slides    2x10      Figure-4 ER stretch    3x15s                                                                            Pt education   Review of HEP; relaxation during stretches        NEUROMUSCULAR REEDUCATION           Quad sets    10x10s.       SLR x 10x  2x10 LLE 2x10 LLE      Sidelying hip abd          Clamshells   Reviewed       Bridging   Reviewed 1x10. 5s      Prone hip ext w/ bent knee   10x B/L  1x10. B/L                                                                                      THERAPEUTIC ACTIVITY                                                  GAIT TRAINING                                                  MODALITIES                               Access Code: [email or web address]  URL: [email or web address]  Date: 05/15/2025  Prepared by: Nima Murray    Exercises  - Supine Quadriceps Stretch with Strap on Table  - 1 x daily - 7 x weekly - 1 sets - 3 reps - 30 hold  - Quadriceps Stretch with Chair  - 1 x daily - 7 x weekly - 1 sets - 3 reps - 30 hold  - Active Straight Leg Raise with Quad Set  - 1 x daily - 7 x weekly - 2 sets - 10 reps - 1 hold

## 2025-05-28 ENCOUNTER — APPOINTMENT (OUTPATIENT)
Dept: PHYSICAL THERAPY | Facility: CLINIC | Age: 60
End: 2025-05-28
Attending: FAMILY MEDICINE
Payer: COMMERCIAL

## 2025-05-29 ENCOUNTER — OFFICE VISIT (OUTPATIENT)
Dept: PHYSICAL THERAPY | Facility: CLINIC | Age: 60
End: 2025-05-29
Attending: FAMILY MEDICINE
Payer: COMMERCIAL

## 2025-05-29 DIAGNOSIS — G89.29 CHRONIC PAIN OF LEFT KNEE: ICD-10-CM

## 2025-05-29 DIAGNOSIS — M25.562 CHRONIC PAIN OF LEFT KNEE: ICD-10-CM

## 2025-05-29 DIAGNOSIS — M25.552 LEFT HIP PAIN: Primary | ICD-10-CM

## 2025-05-29 PROCEDURE — 97112 NEUROMUSCULAR REEDUCATION: CPT

## 2025-05-29 PROCEDURE — 97110 THERAPEUTIC EXERCISES: CPT

## 2025-05-29 NOTE — PROGRESS NOTES
Daily Note      Today's date: 2025  Patient name: Jessica Pat  : 1965  MRN: [id number]  Referring provider: Norma Sanchez MD  Dx:   Encounter Diagnosis     ICD-10-CM    1. Left hip pain  M25.552       2. Chronic pain of left knee  M25.562     G89.29           Subjective: Pt reports that the left hip and left knee are feeling better today. Pt states that the hip ER stretch has been helpful. Pt states that her leg has responded better to stretching now that she has focused on relaxing the muscles.        Objective: See treatment diary below    Assessment: Pt tolerated treatment well. Pt demonstrates improved form and understanding of initial HEP. Pt was able to increase duration of stretches without adverse effects. Pt noted snapping of left hip during heel slides, which improved slightly, but inconsistently with slight hip ER. Consider adding eccentric hip strengthening next visit.     Plan: Continue per plan of care.         Daily Treatment Diary     Precautions: Standard      POC Expires Reeval for Medicare to be completed  Unit Limit Auth Expiration Date PT/OT/STVisit Limit   8/15/25 N/A 4  25 N/A                       Auth Status DATE 5/15 5/20 5/27 5/29     Approved Visit # 1 2 3 4               MANUAL THERAPY         STM/MFR         Joint mobs         Manual flexibility                                    THERAPEUTIC EXERCISE HEP           PROM L hip/knee          Bike   5' L1  6' L1 5' L1      Hip flexor stretch x 1x30s  3x15s w/ rear foot elevated on chair       Quad stretch x 1x30s  Prone 3x15s w/ SOS Prone 3x15s w/ SOS Prone 3x30s w/ SOS     HS stretch    3x15s B/L  3x15s B/L with SOS 3x30s B/L w/ SOS      Heel Slides    2x10 2x10      Figure-4 ER stretch    3x15s 3x15s     90-90 leg lowering                                                                      Pt education   Review of HEP; relaxation during stretches       NEUROMUSCULAR REEDUCATION           Quad sets    10x10s.  10x10s       SLR x 10x  2x10 LLE 2x10 LLE 2x10 B/L      Sidelying hip abd          Clamshells   Reviewed       Bridging   Reviewed 1x10. 5s 2x10 (5s)      Prone hip ext w/ bent knee   10x B/L  1x10. B/L 2x10 B/L                                                                                      THERAPEUTIC ACTIVITY                                                  GAIT TRAINING                                                  MODALITIES                               Access Code: [email or web address]  URL: [email or web address]  Date: 05/15/2025  Prepared by: Nima Murray    Exercises  - Supine Quadriceps Stretch with Strap on Table  - 1 x daily - 7 x weekly - 1 sets - 3 reps - 30 hold  - Quadriceps Stretch with Chair  - 1 x daily - 7 x weekly - 1 sets - 3 reps - 30 hold  - Active Straight Leg Raise with Quad Set  - 1 x daily - 7 x weekly - 2 sets - 10 reps - 1 hold

## 2025-05-30 ENCOUNTER — APPOINTMENT (OUTPATIENT)
Dept: PHYSICAL THERAPY | Facility: CLINIC | Age: 60
End: 2025-05-30
Attending: FAMILY MEDICINE
Payer: COMMERCIAL

## 2025-06-02 ENCOUNTER — APPOINTMENT (OUTPATIENT)
Dept: PHYSICAL THERAPY | Facility: CLINIC | Age: 60
End: 2025-06-02
Attending: FAMILY MEDICINE
Payer: COMMERCIAL

## 2025-06-03 ENCOUNTER — OFFICE VISIT (OUTPATIENT)
Dept: PHYSICAL THERAPY | Facility: CLINIC | Age: 60
End: 2025-06-03
Attending: FAMILY MEDICINE
Payer: COMMERCIAL

## 2025-06-03 DIAGNOSIS — M25.552 LEFT HIP PAIN: Primary | ICD-10-CM

## 2025-06-03 DIAGNOSIS — G89.29 CHRONIC PAIN OF LEFT KNEE: ICD-10-CM

## 2025-06-03 DIAGNOSIS — M25.562 CHRONIC PAIN OF LEFT KNEE: ICD-10-CM

## 2025-06-03 PROCEDURE — 97112 NEUROMUSCULAR REEDUCATION: CPT

## 2025-06-03 PROCEDURE — 97110 THERAPEUTIC EXERCISES: CPT

## 2025-06-03 NOTE — PROGRESS NOTES
"Daily Note      Today's date: 6/3/2025  Patient name: Jessica Pat  : 1965  MRN: [id number]  Referring provider: Norma Sanchez MD  Dx:   Encounter Diagnosis     ICD-10-CM    1. Left hip pain  M25.552       2. Chronic pain of left knee  M25.562     G89.29           Subjective: Pt reports that her left hip is feeling better overall and stretches have been helping. Pt states that when she does feel sore, it does not take as long to recover. Pt states that she recently walked on stone for \"too long\" and has left knee soreness as a result. Pt states that she should have stopped earlier, but continued to push herself.    Objective: See treatment diary below    Assessment: Pt tolerated treatment well. Pt demonstrates good knowledge of self-stretching exercises and today's program focused on functional/closed-chain strengthening. Pt introduced to leg press and required verbal cuing for knee alignment to avoid dynamic valgus. Pt introduced to step ups with focus on glute drive and pt noted mild pain in the left knee and no pain in the right knee. Pt demonstrated that she was performing lateral step downs eccentrically - plan on adding this to exercise program and assessing form/eccentric control.     Plan: Continue per plan of care.         Daily Treatment Diary     Precautions: Standard      POC Expires Reeval for Medicare to be completed  Unit Limit Auth Expiration Date PT/OT/STVisit Limit   8/15/25 N/A 4  25 N/A                       Auth Status DATE 5/15 5/20 5/27 5/29 6/3    Approved Visit # 1 2 3 4 5              MANUAL THERAPY         STM/MFR         Joint mobs         Manual flexibility                                    THERAPEUTIC EXERCISE HEP           PROM L hip/knee          Bike   5' L1  6' L1 5' L1  5' L1    Hip flexor stretch x 1x30s  3x15s w/ rear foot elevated on chair       Quad stretch x 1x30s  Prone 3x15s w/ SOS Prone 3x15s w/ SOS Prone 3x30s w/ SOS     HS stretch    3x15s B/L  3x15s B/L " "with SOS 3x30s B/L w/ SOS      Heel Slides    2x10 2x10      Figure-4 ER stretch    3x15s 3x15s     90-90 leg lowering      NV    Leg press      3x10 70# double leg, seat 4     Mini squat      NV                                            Pt education   Review of HEP; relaxation during stretches       NEUROMUSCULAR REEDUCATION           Quad sets    10x10s.  10x10s  2x10 (5s)     SLR x 10x  2x10 LLE 2x10 LLE 2x10 B/L  2x10 B/L     Sidelying hip abd          Clamshells   Reviewed       Bridging   Reviewed 1x10. 5s 2x10 (5s)  2x10 (5s)     Prone hip ext w/ bent knee   10x B/L  1x10. B/L 2x10 B/L      Step up w/ glute drive      15x B/L 6\" step    Ecc lateral step down      NV                                                                THERAPEUTIC ACTIVITY                                                  GAIT TRAINING                                                  MODALITIES                               Access Code: [email or web address]  URL: [email or web address]  Date: 05/15/2025  Prepared by: Nima Murray    Exercises  - Supine Quadriceps Stretch with Strap on Table  - 1 x daily - 7 x weekly - 1 sets - 3 reps - 30 hold  - Quadriceps Stretch with Chair  - 1 x daily - 7 x weekly - 1 sets - 3 reps - 30 hold  - Active Straight Leg Raise with Quad Set  - 1 x daily - 7 x weekly - 2 sets - 10 reps - 1 hold               "

## 2025-06-04 ENCOUNTER — APPOINTMENT (OUTPATIENT)
Dept: PHYSICAL THERAPY | Facility: CLINIC | Age: 60
End: 2025-06-04
Attending: FAMILY MEDICINE
Payer: COMMERCIAL

## 2025-06-04 DIAGNOSIS — G89.29 CHRONIC PAIN OF LEFT KNEE: ICD-10-CM

## 2025-06-04 DIAGNOSIS — M25.552 LEFT HIP PAIN: Primary | ICD-10-CM

## 2025-06-04 DIAGNOSIS — M25.562 CHRONIC PAIN OF LEFT KNEE: ICD-10-CM

## 2025-06-04 PROCEDURE — 97112 NEUROMUSCULAR REEDUCATION: CPT

## 2025-06-04 PROCEDURE — 97530 THERAPEUTIC ACTIVITIES: CPT

## 2025-06-04 PROCEDURE — 97110 THERAPEUTIC EXERCISES: CPT

## 2025-06-04 NOTE — PROGRESS NOTES
"Daily Note     Today's date: 2025  Patient name: Jessica Pat  : 1965  MRN: [id number]  Referring provider: Norma Sanchez MD  Dx:   Encounter Diagnosis     ICD-10-CM    1. Left hip pain  M25.552       2. Chronic pain of left knee  M25.562     G89.29           Start Time: 917  Stop Time: 956  Total time in clinic (min): 39 minutes    Subjective: she reports that she's doing stretching at home a lot, and the figure-4 stretch is a good stretch. She reports that she is doing the hamstring stretch. She notes that she is trying to do stretches twice per day at home. She notes that her knee was a little sore yesterday but a lot better today. She notes that rest is \"not the recipe.\" She denies any pain walking today.       Objective: See treatment diary below      Assessment: Patient presented with low symptom irritability in today's session. She seems to have improved symptom of clicking upon slight ER positioning with heel slide exercise. 90-90 leg lowering added with emphasis on eccentric control; she performed well with appropriate challenge. Squatting and lunging added with good tolerance and appropriate fatigue. Tolerated treatment well. Patient would benefit from continued PT      Plan: Continue per plan of care.      Daily Treatment Diary     Precautions: Standard      POC Expires Reeval for Medicare to be completed  Unit Limit Auth Expiration Date PT/OT/STVisit Limit   8/15/25 N/A 4  25 N/A                       Auth Status DATE 5/15 5/20 5/27 5/29 6/3 6/4   Approved Visit # 1 2 3 4 5 6             MANUAL THERAPY         STM/MFR         Joint mobs         Manual flexibility                                    THERAPEUTIC EXERCISE HEP           PROM L hip/knee          Bike   5' L1  6' L1 5' L1  5' L1 5' L1   Hip flexor stretch x 1x30s  3x15s w/ rear foot elevated on chair       Quad stretch x 1x30s  Prone 3x15s w/ SOS Prone 3x15s w/ SOS Prone 3x30s w/ SOS  Prone 3x30s w/ SOS   HS stretch    " "3x15s B/L  3x15s B/L with SOS 3x30s B/L w/ SOS      Heel Slides    2x10 2x10   2x10   Figure-4 ER stretch    3x15s 3x15s     Leg press      3x10 70# double leg, seat 4                                                       Pt education   Review of HEP; relaxation during stretches       NEUROMUSCULAR REEDUCATION           Quad sets    10x10s.  10x10s  2x10 (5s)  2x10 (5s)   SLR x 10x  2x10 LLE 2x10 LLE 2x10 B/L  2x10 B/L     Sidelying hip abd          Clamshells   Reviewed       Bridging   Reviewed 1x10. 5s 2x10 (5s)  2x10 (5s)     Prone hip ext w/ bent knee   10x B/L  1x10. B/L 2x10 B/L   2x10 B/L   Step up w/ glute drive      15x B/L 6\" step    Ecc lateral step down      NV 2x10. L. 2\" step   90-90 leg lowering      NV 2x10 B/L.                                                      THERAPEUTIC ACTIVITY          Mini squatting       3x10. Chair with one foam   Mini lunging       2x10. Stationary, straight down                       GAIT TRAINING                                                  MODALITIES                               Access Code: [email or web address]  URL: [email or web address]  Date: 05/15/2025  Prepared by: Nima Murray    Exercises  - Supine Quadriceps Stretch with Strap on Table  - 1 x daily - 7 x weekly - 1 sets - 3 reps - 30 hold  - Quadriceps Stretch with Chair  - 1 x daily - 7 x weekly - 1 sets - 3 reps - 30 hold  - Active Straight Leg Raise with Quad Set  - 1 x daily - 7 x weekly - 2 sets - 10 reps - 1 hold                "

## 2025-06-05 ENCOUNTER — APPOINTMENT (OUTPATIENT)
Dept: PHYSICAL THERAPY | Facility: CLINIC | Age: 60
End: 2025-06-05
Attending: FAMILY MEDICINE
Payer: COMMERCIAL

## 2025-06-09 ENCOUNTER — APPOINTMENT (OUTPATIENT)
Dept: PHYSICAL THERAPY | Facility: CLINIC | Age: 60
End: 2025-06-09
Attending: FAMILY MEDICINE
Payer: COMMERCIAL

## 2025-06-10 ENCOUNTER — OFFICE VISIT (OUTPATIENT)
Dept: PHYSICAL THERAPY | Facility: CLINIC | Age: 60
End: 2025-06-10
Attending: FAMILY MEDICINE
Payer: COMMERCIAL

## 2025-06-10 DIAGNOSIS — M25.552 LEFT HIP PAIN: Primary | ICD-10-CM

## 2025-06-10 DIAGNOSIS — G89.29 CHRONIC PAIN OF LEFT KNEE: ICD-10-CM

## 2025-06-10 DIAGNOSIS — M25.562 CHRONIC PAIN OF LEFT KNEE: ICD-10-CM

## 2025-06-10 PROCEDURE — 97112 NEUROMUSCULAR REEDUCATION: CPT

## 2025-06-10 PROCEDURE — 97110 THERAPEUTIC EXERCISES: CPT

## 2025-06-10 NOTE — PROGRESS NOTES
Daily Note      Today's date: 6/10/2025  Patient name: Jessica Pat  : 1965  MRN: [id number]  Referring provider: Norma Sanchez MD  Dx:   Encounter Diagnosis     ICD-10-CM    1. Left hip pain  M25.552       2. Chronic pain of left knee  M25.562     G89.29           Subjective: Pt reports that her hip and knee are doing well. [place] Pt states that she did this for no more than hour and felt good.        Objective: See treatment diary below    Assessment: Pt tolerated treatment well. Pt progressed to chair taps to a chair with foam and demonstrates improved control eccentrically. Pt was also able to increase depth with lunging with controlled tap to one airex pad. Pt noted stretch throughout the posterior upper thigh with HS stretching, but dorsiflexing the ankle did increase these symptoms, suggesting dural involvement.     Plan: Continue per plan of care.         Daily Treatment Diary     Precautions: Standard      POC Expires Reeval for Medicare to be completed  Unit Limit Auth Expiration Date PT/OT/STVisit Limit   8/15/25 N/A 4  25 N/A                       Auth Status DATE 5/29 6/3 6/4 6/10     Approved Visit # 4 5 6 7               MANUAL THERAPY         STM/MFR         Joint mobs         Manual flexibility                                    THERAPEUTIC EXERCISE HEP           PROM L hip/knee          Bike  5' L1  5' L1 5' L1 5' L1-4     Hip flexor stretch x         Quad stretch x Prone 3x30s w/ SOS  Prone 3x30s w/ SOS Prone 3x30s w/ SOS      HS stretch   3x30s B/L w/ SOS    3x30s B/L      Heel Slides  2x10   2x10      Figure-4 ER stretch  3x15s        Leg press   3x10 70# double leg, seat 4                                                          Pt education          NEUROMUSCULAR REEDUCATION           Quad sets  10x10s  2x10 (5s)  2x10 (5s)      SLR x 2x10 B/L  2x10 B/L   2x10 B/L      Sidelying hip abd          Clamshells       "    Bridging  2x10 (5s)  2x10 (5s)   2x10 (5s)      Prone hip ext w/ bent knee  2x10 B/L   2x10 B/L      Step up w/ glute drive   15x B/L 6\" step       Ecc lateral step down   NV 2x10. L. 2\" step      90-90 leg lowering   NV 2x10 B/L.  2x10 B/L                                                        THERAPEUTIC ACTIVITY          Mini squatting    3x10. Chair with one foam 3x10 chair tap on chair w/ foam      Mini lunging    2x10. Stationary, straight down 2x10 tap to foam pad                          GAIT TRAINING                                                  MODALITIES                               Access Code: [email or web address]  URL: [email or web address]  Date: 05/15/2025  Prepared by: Nima Murray    Exercises  - Supine Quadriceps Stretch with Strap on Table  - 1 x daily - 7 x weekly - 1 sets - 3 reps - 30 hold  - Quadriceps Stretch with Chair  - 1 x daily - 7 x weekly - 1 sets - 3 reps - 30 hold  - Active Straight Leg Raise with Quad Set  - 1 x daily - 7 x weekly - 2 sets - 10 reps - 1 hold                  "

## 2025-06-11 ENCOUNTER — APPOINTMENT (OUTPATIENT)
Dept: PHYSICAL THERAPY | Facility: CLINIC | Age: 60
End: 2025-06-11
Attending: FAMILY MEDICINE
Payer: COMMERCIAL

## 2025-06-12 ENCOUNTER — OFFICE VISIT (OUTPATIENT)
Dept: PHYSICAL THERAPY | Facility: CLINIC | Age: 60
End: 2025-06-12
Attending: FAMILY MEDICINE
Payer: COMMERCIAL

## 2025-06-12 DIAGNOSIS — M25.562 CHRONIC PAIN OF LEFT KNEE: ICD-10-CM

## 2025-06-12 DIAGNOSIS — M25.552 LEFT HIP PAIN: Primary | ICD-10-CM

## 2025-06-12 DIAGNOSIS — G89.29 CHRONIC PAIN OF LEFT KNEE: ICD-10-CM

## 2025-06-12 PROCEDURE — 97110 THERAPEUTIC EXERCISES: CPT

## 2025-06-12 PROCEDURE — 97112 NEUROMUSCULAR REEDUCATION: CPT

## 2025-06-12 NOTE — PROGRESS NOTES
Daily Note      Today's date: 2025  Patient name: Jessica Pat  : 1965  MRN: [id number]  Referring provider: Norma Sanchez MD  Dx:   Encounter Diagnosis     ICD-10-CM    1. Left hip pain  M25.552       2. Chronic pain of left knee  M25.562     G89.29           Subjective: Pt reports that she did some outside work yesterday involving kneeling and digging to put in some plants. Pt states she had some soreness yesterday, but feels almost completely back to baseline today.        Objective: See treatment diary below    Assessment: Pt tolerated treatment well. Pt notes decreased L hip snapping during eccentric hip flexion during open chain activities, however notes this intermittently with single leg extensions. Pt notes decreased symptoms when externally rotating the L hip. Pt introduced to step ups with contralateral hip drive and noted intermittent L hip snapping when descending the stair backwards with L stance leg.     Plan: Continue per plan of care.         Daily Treatment Diary     Precautions: Standard      POC Expires Reeval for Medicare to be completed  Unit Limit Auth Expiration Date PT/OT/STVisit Limit   8/15/25 N/A 4  25 N/A                       Auth Status DATE 5/29 6/3 6/4 6/10 6/12    Approved Visit # 4 5 6 7 8              MANUAL THERAPY         STM/MFR         Joint mobs         Manual flexibility                                    THERAPEUTIC EXERCISE HEP           PROM L hip/knee          Bike  5' L1  5' L1 5' L1 5' L1-4 5' L2-4    Hip flexor stretch x         Quad stretch x Prone 3x30s w/ SOS  Prone 3x30s w/ SOS Prone 3x30s w/ SOS  3x30s w/ SOS    HS stretch   3x30s B/L w/ SOS    3x30s B/L      Heel Slides  2x10   2x10      Figure-4 ER stretch  3x15s        Leg press   3x10 70# double leg, seat 4    2x10 70#  1x10 80#  Seat 3                                                       Pt education          NEUROMUSCULAR REEDUCATION           Quad sets  10x10s  2x10 (5s)  2x10 (5s)     "  SLR x 2x10 B/L  2x10 B/L   2x10 B/L  2x10 B/L     Sidelying hip abd          Clamshells          Bridging  2x10 (5s)  2x10 (5s)   2x10 (5s)  2x10 (5s)     Prone hip ext w/ bent knee  2x10 B/L   2x10 B/L      Step up w/ glute drive   15x B/L 6\" step   W/ contralateral hip drive 2x10 6\" step     Ecc lateral step down   NV 2x10. L. 2\" step  2x10 B/L 6\" step    90-90 leg lowering   NV 2x10 B/L.  2x10 B/L  2x10 heel tap  2x10 single leg ext                                                      THERAPEUTIC ACTIVITY          Mini squatting    3x10. Chair with one foam 3x10 chair tap on chair w/ foam  3x10 chair tap on chair w/ foam     Mini lunging    2x10. Stationary, straight down 2x10 tap to foam pad                          GAIT TRAINING                                                  MODALITIES                               Access Code: [email or web address]  URL: [email or web address]  Date: 05/15/2025  Prepared by: Nima Murray    Exercises  - Supine Quadriceps Stretch with Strap on Table  - 1 x daily - 7 x weekly - 1 sets - 3 reps - 30 hold  - Quadriceps Stretch with Chair  - 1 x daily - 7 x weekly - 1 sets - 3 reps - 30 hold  - Active Straight Leg Raise with Quad Set  - 1 x daily - 7 x weekly - 2 sets - 10 reps - 1 hold                     "

## 2025-06-16 ENCOUNTER — APPOINTMENT (OUTPATIENT)
Dept: PHYSICAL THERAPY | Facility: CLINIC | Age: 60
End: 2025-06-16
Attending: FAMILY MEDICINE
Payer: COMMERCIAL

## 2025-06-17 ENCOUNTER — OFFICE VISIT (OUTPATIENT)
Dept: PHYSICAL THERAPY | Facility: CLINIC | Age: 60
End: 2025-06-17
Attending: FAMILY MEDICINE
Payer: COMMERCIAL

## 2025-06-17 DIAGNOSIS — M25.562 CHRONIC PAIN OF LEFT KNEE: ICD-10-CM

## 2025-06-17 DIAGNOSIS — M25.552 LEFT HIP PAIN: Primary | ICD-10-CM

## 2025-06-17 DIAGNOSIS — G89.29 CHRONIC PAIN OF LEFT KNEE: ICD-10-CM

## 2025-06-17 PROCEDURE — 97110 THERAPEUTIC EXERCISES: CPT

## 2025-06-17 PROCEDURE — 97112 NEUROMUSCULAR REEDUCATION: CPT

## 2025-06-17 NOTE — PROGRESS NOTES
"Daily Note      Today's date: 2025  Patient name: Jessica Pat  : 1965  MRN: [id number]  Referring provider: Norma Sanchez MD  Dx:   Encounter Diagnosis     ICD-10-CM    1. Left hip pain  M25.552       2. Chronic pain of left knee  M25.562     G89.29           Subjective: Pt reports that her hip typically feels more sore when she is not able to move as much, which has been the case recently due to rain for several days.        Objective: See treatment diary below    Assessment: Pt tolerated treatment well. Pt continues to note snapping in the left hip without pain when stepping down from a step with left stance leg. Pt reported improvement in this when focusing on slight hip extension. Pt introduced to eccentric hip flexion with [place] resistance and noted mild snapping in the anterior left hip, without pain. Pt provided with theraband to perform this at home. Pt also introduced to sidestepping in mini squat and noted more fatigue on left glute medius.     Plan: Continue per plan of care.         Daily Treatment Diary     Precautions: Standard      POC Expires Reeval for Medicare to be completed  Unit Limit Auth Expiration Date PT/OT/STVisit Limit   8/15/25 N/A 4  25 N/A                       Auth Status DATE 6/10 6/12 6/17      Approved Visit # 7 8 9                MANUAL THERAPY         STM/MFR         Joint mobs         Manual flexibility                                    THERAPEUTIC EXERCISE HEP         PROM L hip/knee          Bike  5' L1-4 5' L2-4 5' L2-4       Hip flexor stretch x         Quad stretch x Prone 3x30s w/ SOS  3x30s w/ SOS 3x30s w/ SOS       HS stretch   3x30s B/L         Heel Slides          Figure-4 ER stretch          Leg press   2x10 70#  1x10 80#  Seat 3        Forward step down    2x10 B/L 6\" step       Eccentric hip flexion on [place]    2x10 ea 5#                                     Pt education          NEUROMUSCULAR REEDUCATION           Quad sets          SLR x " "2x10 B/L  2x10 B/L  2x10 B/L       Sidelying hip abd          Clamshells          Bridging  2x10 (5s)  2x10 (5s)  2x10 (5s)       Prone hip ext w/ bent knee          Step up w/ glute drive   W/ contralateral hip drive 2x10 6\" step  W/ contralateral hip drive 2x10 6\" step       Ecc lateral step down   2x10 B/L 6\" step 2x10 B/L 6\" step       90-90 leg lowering  2x10 B/L  2x10 heel tap  2x10 single leg ext       Lateral walks     10x12 ft w/ GTB around knees                                              THERAPEUTIC ACTIVITY          Mini squatting  3x10 chair tap on chair w/ foam  3x10 chair tap on chair w/ foam  2x10 chair tap w/ foam       Mini lunging  2x10 tap to foam pad                             GAIT TRAINING                                                  MODALITIES                               Access Code: [email or web address]  URL: [email or web address]  Date: 05/15/2025  Prepared by: Nima Murray    Exercises  - Supine Quadriceps Stretch with Strap on Table  - 1 x daily - 7 x weekly - 1 sets - 3 reps - 30 hold  - Quadriceps Stretch with Chair  - 1 x daily - 7 x weekly - 1 sets - 3 reps - 30 hold  - Active Straight Leg Raise with Quad Set  - 1 x daily - 7 x weekly - 2 sets - 10 reps - 1 hold                        "

## 2025-06-18 ENCOUNTER — APPOINTMENT (OUTPATIENT)
Dept: PHYSICAL THERAPY | Facility: CLINIC | Age: 60
End: 2025-06-18
Attending: FAMILY MEDICINE
Payer: COMMERCIAL

## 2025-06-19 ENCOUNTER — OFFICE VISIT (OUTPATIENT)
Dept: PHYSICAL THERAPY | Facility: CLINIC | Age: 60
End: 2025-06-19
Attending: FAMILY MEDICINE
Payer: COMMERCIAL

## 2025-06-19 DIAGNOSIS — M25.562 CHRONIC PAIN OF LEFT KNEE: ICD-10-CM

## 2025-06-19 DIAGNOSIS — G89.29 CHRONIC PAIN OF LEFT KNEE: ICD-10-CM

## 2025-06-19 DIAGNOSIS — M25.552 LEFT HIP PAIN: Primary | ICD-10-CM

## 2025-06-19 PROCEDURE — 97110 THERAPEUTIC EXERCISES: CPT

## 2025-06-19 PROCEDURE — 97112 NEUROMUSCULAR REEDUCATION: CPT

## 2025-06-19 NOTE — PROGRESS NOTES
"Daily Note      Today's date: 2025  Patient name: Jessica Pat  : 1965  MRN: [id number]  Referring provider: Norma Sanchez MD  Dx:   Encounter Diagnosis     ICD-10-CM    1. Left hip pain  M25.552       2. Chronic pain of left knee  M25.562     G89.29           Subjective: Pt reports that her hip and knee feel good - denies pain at this time.        Objective: See treatment diary below    Assessment: Pt tolerated treatment well. Pt presented without anterior L hip snapping during 90-90 heel taps, but continues to report this symptom with stepping backward with RLE from step up. Pt was able to progress step height with all stair activities without increase in pain. Pt was also able to increase resistance with [place]-resisted eccentric hip flexion with L hip snapping, but no increase in pain.      Plan: Continue per plan of care.         Daily Treatment Diary     Precautions: Standard      POC Expires Reeval for Medicare to be completed  Unit Limit Auth Expiration Date PT/OT/STVisit Limit   8/15/25 N/A 4  25 N/A                       Auth Status DATE 6/10 6/12 6/17 6/19     Approved Visit # 7 8 9 10               MANUAL THERAPY         STM/MFR         Joint mobs         Manual flexibility                                    THERAPEUTIC EXERCISE HEP         PROM L hip/knee          Bike  5' L1-4 5' L2-4 5' L2-4  5' L2-4     Hip flexor stretch x         Quad stretch x Prone 3x30s w/ SOS  3x30s w/ SOS 3x30s w/ SOS  3x30s w/ SOS     HS stretch   3x30s B/L         Heel Slides          Figure-4 ER stretch          Leg press   2x10 70#  1x10 80#  Seat 3        Forward step down    2x10 B/L 6\" step  2x10 B/L 8\" step      Eccentric hip flexion on [place]    2x10 ea 5#  2x10 ea 7#                                   Pt education          NEUROMUSCULAR REEDUCATION           Quad sets          SLR x 2x10 B/L  2x10 B/L  2x10 B/L  2x10 B/L      Sidelying hip abd          Clamshells          Bridging  2x10 (5s)  2x10 " "(5s)  2x10 (5s)  2x10 (5s)      Prone hip ext w/ bent knee          Step up w/ glute drive   W/ contralateral hip drive 2x10 6\" step  W/ contralateral hip drive 2x10 6\" step  W/ contralateral hip drive 2x10 8\"      Ecc lateral step down   2x10 B/L 6\" step 2x10 B/L 6\" step  2x10 B/L 8\" step      90-90 leg lowering  2x10 B/L  2x10 heel tap  2x10 single leg ext  2x10 heel tap      Lateral walks     10x12 ft w/ GTB around knees                                              THERAPEUTIC ACTIVITY          Mini squatting  3x10 chair tap on chair w/ foam  3x10 chair tap on chair w/ foam  2x10 chair tap w/ foam  2x10 chair tap w/ foam      Mini lunging  2x10 tap to foam pad                             GAIT TRAINING                                                  MODALITIES                               Access Code: [email or web address]  URL: [email or web address]  Date: 05/15/2025  Prepared by: Nima Murray    Exercises  - Supine Quadriceps Stretch with Strap on Table  - 1 x daily - 7 x weekly - 1 sets - 3 reps - 30 hold  - Quadriceps Stretch with Chair  - 1 x daily - 7 x weekly - 1 sets - 3 reps - 30 hold  - Active Straight Leg Raise with Quad Set  - 1 x daily - 7 x weekly - 2 sets - 10 reps - 1 hold                           "

## 2025-06-23 ENCOUNTER — APPOINTMENT (OUTPATIENT)
Dept: PHYSICAL THERAPY | Facility: CLINIC | Age: 60
End: 2025-06-23
Attending: FAMILY MEDICINE
Payer: COMMERCIAL

## 2025-06-24 ENCOUNTER — OFFICE VISIT (OUTPATIENT)
Dept: PHYSICAL THERAPY | Facility: CLINIC | Age: 60
End: 2025-06-24
Attending: FAMILY MEDICINE
Payer: COMMERCIAL

## 2025-06-24 DIAGNOSIS — M25.562 CHRONIC PAIN OF LEFT KNEE: ICD-10-CM

## 2025-06-24 DIAGNOSIS — M25.552 LEFT HIP PAIN: Primary | ICD-10-CM

## 2025-06-24 DIAGNOSIS — G89.29 CHRONIC PAIN OF LEFT KNEE: ICD-10-CM

## 2025-06-24 PROCEDURE — 97112 NEUROMUSCULAR REEDUCATION: CPT

## 2025-06-24 PROCEDURE — 97110 THERAPEUTIC EXERCISES: CPT

## 2025-06-24 NOTE — PROGRESS NOTES
"Daily Note      Today's date: 2025  Patient name: Jessica Pat  : 1965  MRN: [id number]  Referring provider: Norma Sanchez MD  Dx:   Encounter Diagnosis     ICD-10-CM    1. Left hip pain  M25.552       2. Chronic pain of left knee  M25.562     G89.29           Subjective: Pt reports that her hip and knee are feeling good today. Pt states that she was gardening over the weekend when she got stung by a wasp on her right wrist/hand. Pt states this is doing better now.        Objective: See treatment diary below    Assessment: Pt tolerated treatment well. Pt noted minimal snapping in the left hip, but continues to report this during stepping down backward from a step, as well as during eccentric hip flexion with [place] resistance. Pt notes overall decrease in frequency and severity of snapping. Pt introduced to lateral walks with [place] and noted more difficulty with concentric and eccentric left lateral stepping.     Plan: Continue per plan of care.         Daily Treatment Diary     Precautions: Standard      POC Expires Reeval for Medicare to be completed  Unit Limit Auth Expiration Date PT/OT/STVisit Limit   8/15/25 N/A 4  25 N/A                       Auth Status DATE 6/10 6/12 6/17 6/19 6/24    Approved Visit # 7 8 9 10 11              MANUAL THERAPY         STM/MFR         Joint mobs         Manual flexibility                                    THERAPEUTIC EXERCISE HEP         PROM L hip/knee          Bike  5' L1-4 5' L2-4 5' L2-4  5' L2-4 5' L2-4    Hip flexor stretch x         Quad stretch x Prone 3x30s w/ SOS  3x30s w/ SOS 3x30s w/ SOS  3x30s w/ SOS     HS stretch   3x30s B/L         Heel Slides          Figure-4 ER stretch          Leg press   2x10 70#  1x10 80#  Seat 3    1x10 90#  1x10 110#  1x10 120#    Forward step down    2x10 B/L 6\" step  2x10 B/L 8\" step  2x10 B/L 8\" step    Eccentric hip flexion on [place]    2x10 ea 5#  2x10 ea 7# 3x10 ea 7#                                  Pt " "education          NEUROMUSCULAR REEDUCATION           Quad sets          SLR x 2x10 B/L  2x10 B/L  2x10 B/L  2x10 B/L  2x10 B/L     Sidelying hip abd          Clamshells          Bridging  2x10 (5s)  2x10 (5s)  2x10 (5s)  2x10 (5s)  2x10 (5s)     Prone hip ext w/ bent knee          Step up w/ glute drive   W/ contralateral hip drive 2x10 6\" step  W/ contralateral hip drive 2x10 6\" step  W/ contralateral hip drive 2x10 8\"  W/ contralateral hip drive 2x10 8\"     Ecc lateral step down   2x10 B/L 6\" step 2x10 B/L 6\" step  2x10 B/L 8\" step  2x10 B/L 8\" step     90-90 leg lowering  2x10 B/L  2x10 heel tap  2x10 single leg ext  2x10 heel tap      Lateral walks     10x12 ft w/ GTB around knees  6x ea w/ belt on [place] 12#                                             THERAPEUTIC ACTIVITY          Mini squatting  3x10 chair tap on chair w/ foam  3x10 chair tap on chair w/ foam  2x10 chair tap w/ foam  2x10 chair tap w/ foam      Mini lunging  2x10 tap to foam pad                             GAIT TRAINING                                                  MODALITIES                               Access Code: [email or web address]  URL: [email or web address]  Date: 05/15/2025  Prepared by: Nima Murray    Exercises  - Supine Quadriceps Stretch with Strap on Table  - 1 x daily - 7 x weekly - 1 sets - 3 reps - 30 hold  - Quadriceps Stretch with Chair  - 1 x daily - 7 x weekly - 1 sets - 3 reps - 30 hold  - Active Straight Leg Raise with Quad Set  - 1 x daily - 7 x weekly - 2 sets - 10 reps - 1 hold                              "

## 2025-06-25 ENCOUNTER — APPOINTMENT (OUTPATIENT)
Dept: PHYSICAL THERAPY | Facility: CLINIC | Age: 60
End: 2025-06-25
Attending: FAMILY MEDICINE
Payer: COMMERCIAL

## 2025-06-26 ENCOUNTER — OFFICE VISIT (OUTPATIENT)
Dept: PHYSICAL THERAPY | Facility: CLINIC | Age: 60
End: 2025-06-26
Attending: FAMILY MEDICINE
Payer: COMMERCIAL

## 2025-06-26 DIAGNOSIS — M25.562 CHRONIC PAIN OF LEFT KNEE: ICD-10-CM

## 2025-06-26 DIAGNOSIS — M25.552 LEFT HIP PAIN: Primary | ICD-10-CM

## 2025-06-26 DIAGNOSIS — G89.29 CHRONIC PAIN OF LEFT KNEE: ICD-10-CM

## 2025-06-26 PROCEDURE — 97112 NEUROMUSCULAR REEDUCATION: CPT

## 2025-06-26 PROCEDURE — 97110 THERAPEUTIC EXERCISES: CPT

## 2025-06-26 NOTE — PROGRESS NOTES
"Daily Note      Today's date: 2025  Patient name: Jessica Pat  : 1965  MRN: [id number]  Referring provider: Norma Sanchez MD  Dx:   Encounter Diagnosis     ICD-10-CM    1. Left hip pain  M25.552       2. Chronic pain of left knee  M25.562     G89.29           Subjective: Pt reports no left hip pain currently. Pt states that she feels her legs getting stronger and that PT is helping.      Objective: See treatment diary below    Assessment: Pt tolerated treatment well. Pt continues to note anterior left hip snapping during stepping backward from step, as well as eccentric hip flexion with [place] resistance. Pt notes improvement in the severity of the left hip snapping. Pt was able to progress resistance with eccentric hip flexion and decreased use of UE support. Pt also noted that forward step downs were previously strenuous and provocative of pain, but now has no pain with this and fatigue is decreasing.     Plan: Continue per plan of care.         Daily Treatment Diary     Precautions: Standard      POC Expires Reeval for Medicare to be completed  Unit Limit Auth Expiration Date PT/OT/STVisit Limit   8/15/25 N/A 4  25 N/A                       Auth Status DATE       Approved Visit # 10 11 12                MANUAL THERAPY         STM/MFR         Joint mobs         Manual flexibility                                    THERAPEUTIC EXERCISE HEP         PROM L hip/knee          Bike  5' L2-4 5' L2-4 5' L2-4       Hip flexor stretch x         Quad stretch x 3x30s w/ SOS  3x30s w/ SOS      HS stretch           Heel Slides          Figure-4 ER stretch          Leg press   1x10 90#  1x10 110#  1x10 120# 10x 90#  10x 110#  10x 130#      Forward step down  2x10 B/L 8\" step  2x10 B/L 8\" step 2x10 B/L 8\" step       Eccentric hip flexion on [place]  2x10 ea 7# 3x10 ea 7# 3x10 ea                                     Pt education          NEUROMUSCULAR REEDUCATION           Quad sets          SLR x " "2x10 B/L  2x10 B/L  2x10 B/L       Sidelying hip abd          Clamshells          Bridging  2x10 (5s)  2x10 (5s)  2x10 (5s)       Prone hip ext w/ bent knee          Step up w/ glute drive  W/ contralateral hip drive 2x10 8\"  W/ contralateral hip drive 2x10 8\"  W/ contralateral hip drive 2x10 8\"       Ecc lateral step down  2x10 B/L 8\" step  2x10 B/L 8\" step  2x10 B/L 8\" step       90-90 leg lowering  2x10 heel tap   2x10 heel tap      Lateral walks    6x ea w/ belt on [place] 12#                                                THERAPEUTIC ACTIVITY          Mini squatting  2x10 chair tap w/ foam         Mini lunging                              GAIT TRAINING                                                  MODALITIES                               Access Code: [email or web address]  URL: [email or web address]  Date: 05/15/2025  Prepared by: Nima Murray    Exercises  - Supine Quadriceps Stretch with Strap on Table  - 1 x daily - 7 x weekly - 1 sets - 3 reps - 30 hold  - Quadriceps Stretch with Chair  - 1 x daily - 7 x weekly - 1 sets - 3 reps - 30 hold  - Active Straight Leg Raise with Quad Set  - 1 x daily - 7 x weekly - 2 sets - 10 reps - 1 hold                                 "

## 2025-06-30 ENCOUNTER — APPOINTMENT (OUTPATIENT)
Dept: PHYSICAL THERAPY | Facility: CLINIC | Age: 60
End: 2025-06-30
Attending: FAMILY MEDICINE
Payer: COMMERCIAL

## 2025-07-01 ENCOUNTER — OFFICE VISIT (OUTPATIENT)
Dept: PHYSICAL THERAPY | Facility: CLINIC | Age: 60
End: 2025-07-01
Attending: FAMILY MEDICINE
Payer: COMMERCIAL

## 2025-07-01 DIAGNOSIS — M25.562 CHRONIC PAIN OF LEFT KNEE: ICD-10-CM

## 2025-07-01 DIAGNOSIS — G89.29 CHRONIC PAIN OF LEFT KNEE: ICD-10-CM

## 2025-07-01 DIAGNOSIS — M25.552 LEFT HIP PAIN: Primary | ICD-10-CM

## 2025-07-01 PROCEDURE — 97110 THERAPEUTIC EXERCISES: CPT

## 2025-07-01 PROCEDURE — 97112 NEUROMUSCULAR REEDUCATION: CPT

## 2025-07-01 NOTE — PROGRESS NOTES
"Daily Note      Today's date: 2025  Patient name: Jessica Pat  : 1965  MRN: [id number]  Referring provider: Norma Sanchez MD  Dx:   Encounter Diagnosis     ICD-10-CM    1. Left hip pain  M25.552       2. Chronic pain of left knee  M25.562     G89.29           Subjective: Pt reports that her knee is getting much better. Pt states her left hip still snaps during the same activities.        Objective: See treatment diary below    Assessment: Pt tolerated treatment well. Pt presented without left hip snapping during SLR and no longer modified hip rotation during this exercise. Pt progressed to step ups with contralateral hip drive with medicine ball overhead press this visit and noted no change in anterior hip snapping. Pt demonstrates improved control of eccentric hip flexion, despite anterior hip snapping still present during eccentric phase.     Plan: Continue per plan of care.         Daily Treatment Diary     Precautions: Standard      POC Expires Reeval for Medicare to be completed  Unit Limit Auth Expiration Date PT/OT/STVisit Limit   8/15/25 N/A 4  25 N/A                       Auth Status DATE      Approved Visit # 10 11 12 13               MANUAL THERAPY         STM/MFR         Joint mobs         Manual flexibility                                    THERAPEUTIC EXERCISE HEP         PROM L hip/knee          Bike  5' L2-4 5' L2-4 5' L2-4  5' L2-4     Hip flexor stretch x         Quad stretch x 3x30s w/ SOS  3x30s w/ SOS 3x30s w/ SOS      HS stretch           Heel Slides          Figure-4 ER stretch          Leg press   1x10 90#  1x10 110#  1x10 120# 10x 90#  10x 110#  10x 130# 10x 90#  10x 110#  10x 130# 100#    Forward step down  2x10 B/L 8\" step  2x10 B/L 8\" step 2x10 B/L 8\" step  2x10 B/L 8\" step      Eccentric hip flexion on [place]  2x10 ea 7# 3x10 ea 7# 3x10 ea  3x10 ea 10#                                    Pt education          NEUROMUSCULAR REEDUCATION           Quad " "sets          SLR x 2x10 B/L  2x10 B/L  2x10 B/L  2x10 B/L      Sidelying hip abd          Clamshells          Bridging  2x10 (5s)  2x10 (5s)  2x10 (5s)  2x10 (5s)      Prone hip ext w/ bent knee          Step up w/ glute drive  W/ contralateral hip drive 2x10 8\"  W/ contralateral hip drive 2x10 8\"  W/ contralateral hip drive 2x10 8\"  W/ contralateral hip drive and 7# med ball overhead press 2x10 8\"      Ecc lateral step down  2x10 B/L 8\" step  2x10 B/L 8\" step  2x10 B/L 8\" step  2x10 B/L 8\" step      90-90 leg lowering  2x10 heel tap   2x10 heel tap TA act w/ single leg ext 2x10 ea      Lateral walks    6x ea w/ belt on [place] 12#   Retro walk 10x 15# on [place]                                             THERAPEUTIC ACTIVITY          Mini squatting  2x10 chair tap w/ foam    2x10 chair tap      Mini lunging                              GAIT TRAINING                                                  MODALITIES                               Access Code: [email or web address]  URL: [email or web address]  Date: 05/15/2025  Prepared by: Nima Murray    Exercises  - Supine Quadriceps Stretch with Strap on Table  - 1 x daily - 7 x weekly - 1 sets - 3 reps - 30 hold  - Quadriceps Stretch with Chair  - 1 x daily - 7 x weekly - 1 sets - 3 reps - 30 hold  - Active Straight Leg Raise with Quad Set  - 1 x daily - 7 x weekly - 2 sets - 10 reps - 1 hold                                    "

## 2025-07-02 ENCOUNTER — APPOINTMENT (OUTPATIENT)
Dept: PHYSICAL THERAPY | Facility: CLINIC | Age: 60
End: 2025-07-02
Attending: FAMILY MEDICINE
Payer: COMMERCIAL

## 2025-07-03 ENCOUNTER — OFFICE VISIT (OUTPATIENT)
Dept: PHYSICAL THERAPY | Facility: CLINIC | Age: 60
End: 2025-07-03
Attending: FAMILY MEDICINE
Payer: COMMERCIAL

## 2025-07-03 DIAGNOSIS — G89.29 CHRONIC PAIN OF LEFT KNEE: ICD-10-CM

## 2025-07-03 DIAGNOSIS — M25.552 LEFT HIP PAIN: Primary | ICD-10-CM

## 2025-07-03 DIAGNOSIS — M25.562 CHRONIC PAIN OF LEFT KNEE: ICD-10-CM

## 2025-07-03 PROCEDURE — 97112 NEUROMUSCULAR REEDUCATION: CPT

## 2025-07-03 PROCEDURE — 97110 THERAPEUTIC EXERCISES: CPT

## 2025-07-03 NOTE — PROGRESS NOTES
"Daily Note      Today's date: 7/3/2025  Patient name: Jessica Pat  : 1965  MRN: [id number]  Referring provider: Norma Sanchez MD  Dx:   Encounter Diagnosis     ICD-10-CM    1. Left hip pain  M25.552       2. Chronic pain of left knee  M25.562     G89.29           Subjective: Pt reports that her left hip and knee are feeling really good. Pt states that she is getting stronger and that PT is helping.        Objective: See treatment diary below    Assessment: Pt tolerated treatment well. Pt noted anterior hip snapping during 1 repetition of SLR with the LLE, Pt demonstrates improved control during eccentric hip flexion with LLE as stance leg, as well as LLE as moving leg.     Plan: Continue per plan of care.         Daily Treatment Diary     Precautions: Standard      POC Expires Reeval for Medicare to be completed  Unit Limit Auth Expiration Date PT/OT/STVisit Limit   8/15/25 N/A 4  25 N/A                       Auth Status DATE 6/19 6/24 6/26 7/1 7/3    Approved Visit # 10 11 12 13 14              MANUAL THERAPY         STM/MFR         Joint mobs         Manual flexibility                                    THERAPEUTIC EXERCISE HEP         PROM L hip/knee          Bike  5' L2-4 5' L2-4 5' L2-4  5' L2-4 5' L2-4     Hip flexor stretch x         Quad stretch x 3x30s w/ SOS  3x30s w/ SOS 3x30s w/ SOS  3x30s     HS stretch           Heel Slides          Figure-4 ER stretch          Leg press   1x10 90#  1x10 110#  1x10 120# 10x 90#  10x 110#  10x 130# 10x 90#  10x 110#  10x 130#  100#   Forward step down  2x10 B/L 8\" step  2x10 B/L 8\" step 2x10 B/L 8\" step  2x10 B/L 8\" step  2x10 B/L 8\" step     Eccentric hip flexion on [place]  2x10 ea 7# 3x10 ea 7# 3x10 ea  3x10 ea 10#  3x10 ea 10#                                  Pt education          NEUROMUSCULAR REEDUCATION           Quad sets          SLR x 2x10 B/L  2x10 B/L  2x10 B/L  2x10 B/L  2x10 B/L     Sidelying hip abd          Clamshells          Bridging  " "2x10 (5s)  2x10 (5s)  2x10 (5s)  2x10 (5s)  2x10 (5s)     Prone hip ext w/ bent knee          Step up w/ glute drive  W/ contralateral hip drive 2x10 8\"  W/ contralateral hip drive 2x10 8\"  W/ contralateral hip drive 2x10 8\"  W/ contralateral hip drive and 7# med ball overhead press 2x10 8\"  W/ contralateral hip drive and 7# med ball overhead press 2x10 8\"     Ecc lateral step down  2x10 B/L 8\" step  2x10 B/L 8\" step  2x10 B/L 8\" step  2x10 B/L 8\" step  2x10 B/L 8\" step     90-90 leg lowering  2x10 heel tap   2x10 heel tap TA act w/ single leg ext 2x10 ea      Lateral walks    6x ea w/ belt on [place] 12#   Retro walk 10x 15# on [place] Forward and retro walk 6x ea 15# on [place]                                            THERAPEUTIC ACTIVITY          Mini squatting  2x10 chair tap w/ foam    2x10 chair tap      Mini lunging                              GAIT TRAINING                                                  MODALITIES                               Access Code: [email or web address]  URL: [email or web address]  Date: 05/15/2025  Prepared by: Nima Murray    Exercises  - Supine Quadriceps Stretch with Strap on Table  - 1 x daily - 7 x weekly - 1 sets - 3 reps - 30 hold  - Quadriceps Stretch with Chair  - 1 x daily - 7 x weekly - 1 sets - 3 reps - 30 hold  - Active Straight Leg Raise with Quad Set  - 1 x daily - 7 x weekly - 2 sets - 10 reps - 1 hold                                       "

## 2025-07-07 ENCOUNTER — APPOINTMENT (OUTPATIENT)
Dept: PHYSICAL THERAPY | Facility: CLINIC | Age: 60
End: 2025-07-07
Attending: FAMILY MEDICINE
Payer: COMMERCIAL

## 2025-07-08 ENCOUNTER — OFFICE VISIT (OUTPATIENT)
Dept: PHYSICAL THERAPY | Facility: CLINIC | Age: 60
End: 2025-07-08
Attending: FAMILY MEDICINE
Payer: COMMERCIAL

## 2025-07-08 DIAGNOSIS — M25.552 LEFT HIP PAIN: Primary | ICD-10-CM

## 2025-07-08 DIAGNOSIS — G89.29 CHRONIC PAIN OF LEFT KNEE: ICD-10-CM

## 2025-07-08 DIAGNOSIS — M25.562 CHRONIC PAIN OF LEFT KNEE: ICD-10-CM

## 2025-07-08 PROCEDURE — 97110 THERAPEUTIC EXERCISES: CPT

## 2025-07-08 PROCEDURE — 97112 NEUROMUSCULAR REEDUCATION: CPT

## 2025-07-08 NOTE — PROGRESS NOTES
PT Re-Evaluation     Today's date: 2025  Patient name: Jessica Pat  : 1965  MRN: [id number]  Referring provider: Norma Sanchez MD  Dx:   Encounter Diagnosis     ICD-10-CM    1. Left hip pain  M25.552       2. Chronic pain of left knee  M25.562     G89.29                      Assessment  Impairments: abnormal gait, abnormal muscle firing, abnormal or restricted ROM, activity intolerance, impaired physical strength, lacks appropriate home exercise program, pain with function and poor body mechanics    Assessment details: Jessica Pat has been seen in hospital-based outpatient PT for 15 visits with chronic left hip and left knee pain. Patient demonstrates excellent progress toward short-term and long-term physical therapy goals. Patient presents with the following improvements noted: decreased left hip/knee pain, improved left hip mobility, improved left hip/knee strength, and decreased reports of left hip snapping. Patient continues to present with the following impairments: left hip and left knee pain, limited left hip and knee strength, and increased tissue tension of left hip flexors and quads. Due to these impairments, patient continues to have difficulty performing the following: ascending stairs, walking on uneven terrain, hiking, bicycling, and lifting the left leg while standing. Patient would benefit from continued skilled physical therapy services to address the above functional limitations and progress towards prior level of function and independence with home exercise program.  Understanding of Dx/Px/POC: good     Prognosis: good    Goals  Short Term Goals [3 weeks; target date: 25]  1. Patient will be independent with initial HEP. - GOAL MET  2. Patient will present with improved left hip flexion AROM of 5 degrees. - GOAL MET  3. Patient will demonstrate an increase in left hip strength of 1/2 grade on MMT. - GOAL MET    Long Term Goals [6 weeks; target date: 8/15/25]  1. Patient will  demonstrate an increase in left hip AROM to WNL in order to promote self-care activities pain-free. - in progress  2. Patient will demonstrate an increase in left hip/knee strength of 1 grade on MMT in order to promote improved stair ambulation. - in progress  3. Patient will be able to hike for 1 hour without left hip/knee pain. - GOAL MET  4. Patient will be able to ascend/descend 10 stairs reciprocally without pain. - in progress    Plan  Patient would benefit from: skilled physical therapy  Planned modality interventions: cryotherapy, electrical stimulation/Russian stimulation, TENS, ultrasound, thermotherapy: hydrocollator packs, unattended electrical stimulation, high voltage pulsed current: pain management and high voltage pulsed current: spasm management    Planned therapy interventions: flexibility, functional ROM exercises, graded exercise, home exercise program, joint mobilization, manual therapy, neuromuscular re-education, patient education, strengthening, stretching, therapeutic exercise, therapeutic activities, [place] taping, balance/weight bearing training, gait training, abdominal trunk stabilization, activity modification, balance, body mechanics training, graded activity, self care, postural training, IADL retraining, ADL training, breathing training, behavior modification, muscle pump exercises, therapeutic training and transfer training    Frequency: 2x week  Plan of Care beginning date: 7/8/2025  Plan of Care expiration date: 8/15/2025  Treatment plan discussed with: patient    Subjective Evaluation    History of Present Illness  Mechanism of injury: October 2023, had left knee pain and then mowed the lawn and weedwacking. Woke up with pain the next day and pain with applying weight for 10-12 days. Began improving and returned to activity. Pt states that during that [place], her daughter tore her meniscus. Pt states she has been able to return to running, but has had left hip pain. Pt  states that resting helped her left hip, but not her knee. Pt states that she has medial knee pain and left hip flexor pain. Pt states her knee hurts with hiking and walking on uneven ground. Pt states that going up the stairs is painful. Pt states she has also had pain in the back of the knee, which is also worse with hiking. Pt states her knee feels worst with bicycling. Pt denies any knee buckling episodes.     (25) Pt reports good progress since starting PT. Pt states she feels more stable standing on the left leg. Pt states that pain in the back of the knee has improved significantly. Pt states that she only feels this now during a long day of yard work and it recovers by the next day. Pt states the snapping in the left hip has decreased in intensity and how often it occurs. Pt reports that despite significant progress, she still notices that her left hip/knee are weaker compared to the right.   Patient Goals  Patient goals for therapy: decreased pain and return to sport/leisure activities    Pain  Current pain ratin  At best pain ratin  At worst pain ratin  Location: Left hip (Left knee C: 1, B: 0, W: 1)  Quality: dull ache and sharp (Dull in hip, dull/sharp in knee)  Alleviating factors: Hip improves with rest, knee does not.  Progression: no change    Social Support  Steps to enter house: no  Stairs in house: yes (Spiral staircase)   10  Lives with: spouse    Employment status: not working  Hand dominance: right  Exercise history: Every day - hiking, walking, running, planks, push ups      Diagnostic Tests  No diagnostic tests performed  Treatments  Treatments tried: Home exercises.      Objective     Active Range of Motion   Left Knee   Flexion: 132 degrees   Extension: 0 degrees     Right Knee   Flexion: 138 degrees   Extension: 0 degrees     Passive Range of Motion   Left Hip   Flexion: 122 degrees   Abduction: 62 degrees   External rotation (90/90): 32 degrees   Internal rotation (90/90):  "22 degrees     Right Hip   Flexion: 121 degrees   Abduction: 65 degrees   External rotation (90/90): 40 degrees   Internal rotation (90/90): 25 degrees     Strength/Myotome Testing     Left Hip   Planes of Motion   Flexion: 5  Abduction: 4+  Adduction: 4+    Right Hip   Planes of Motion   Flexion: 5  Abduction: 5  Adduction: 4+    Left Knee   Flexion: 5  Extension: 5  Quadriceps contraction: good    Right Knee   Flexion: 5  Extension: 5  Quadriceps contraction: good    Additional Strength Details  Notes left hip flexor tenderness with SLR on L     General Comments:      Knee Comments  Flexibility - mild limitation in left iliopsoas/quads; WNL in right          Daily Treatment Diary     Precautions: Standard      POC Expires Reeval for Medicare to be completed  Unit Limit Auth Expiration Date PT/OT/STVisit Limit   8/15/25 N/A 4  12/31/25 N/A                       Auth Status DATE 7/1 7/3 7/8 RE      Approved Visit # 13 14 15                MANUAL THERAPY         STM/MFR         Joint mobs         Manual flexibility                                    THERAPEUTIC EXERCISE HEP         PROM L hip/knee          Bike  5' L2-4 5' L2-4  5' L2-4      Hip flexor stretch x         Quad stretch x 3x30s w/ SOS  3x30s  3x30s       HS stretch           Heel Slides          Figure-4 ER stretch          Leg press  10x 90#  10x 110#  10x 130#   100#     Forward step down  2x10 B/L 8\" step  2x10 B/L 8\" step  2x10 B/L 8\" step       Eccentric hip flexion on [place]  3x10 ea 10#  3x10 ea 10# 3x10 ea 10#                                    Pt education          NEUROMUSCULAR REEDUCATION           Quad sets          SLR x 2x10 B/L  2x10 B/L  2x10 B/L       Sidelying hip abd          Clamshells          Bridging  2x10 (5s)  2x10 (5s)  2x10 (5s)       Prone hip ext w/ bent knee          Step up w/ glute drive  W/ contralateral hip drive and 7# med ball overhead press 2x10 8\"  W/ contralateral hip drive and 7# med ball overhead press 2x10 8\"  W/ " "contralateral hip drive and 7# med ball overhead press 2x10 8\"       Ecc lateral step down  2x10 B/L 8\" step  2x10 B/L 8\" step  2x10 B/L 8\" step       90-90 leg lowering  TA act w/ single leg ext 2x10 ea         Lateral walks   Retro walk 10x 15# on [place] Forward and retro walk 6x ea 15# on [place] Lateral walks 5x ea 15# on [place]                                              THERAPEUTIC ACTIVITY          Mini squatting  2x10 chair tap   2x10 chair tap       Mini lunging                              GAIT TRAINING                                                  MODALITIES                               Access Code: [email or web address]  URL: [email or web address]  Date: 05/15/2025  Prepared by: Nima Murray    Exercises  - Supine Quadriceps Stretch with Strap on Table  - 1 x daily - 7 x weekly - 1 sets - 3 reps - 30 hold  - Quadriceps Stretch with Chair  - 1 x daily - 7 x weekly - 1 sets - 3 reps - 30 hold  - Active Straight Leg Raise with Quad Set  - 1 x daily - 7 x weekly - 2 sets - 10 reps - 1 hold          "

## 2025-07-09 ENCOUNTER — APPOINTMENT (OUTPATIENT)
Dept: PHYSICAL THERAPY | Facility: CLINIC | Age: 60
End: 2025-07-09
Attending: FAMILY MEDICINE
Payer: COMMERCIAL

## 2025-07-10 ENCOUNTER — OFFICE VISIT (OUTPATIENT)
Dept: PHYSICAL THERAPY | Facility: CLINIC | Age: 60
End: 2025-07-10
Attending: FAMILY MEDICINE
Payer: COMMERCIAL

## 2025-07-10 DIAGNOSIS — M25.552 LEFT HIP PAIN: Primary | ICD-10-CM

## 2025-07-10 DIAGNOSIS — G89.29 CHRONIC PAIN OF LEFT KNEE: ICD-10-CM

## 2025-07-10 DIAGNOSIS — M25.562 CHRONIC PAIN OF LEFT KNEE: ICD-10-CM

## 2025-07-10 PROCEDURE — 97112 NEUROMUSCULAR REEDUCATION: CPT

## 2025-07-10 PROCEDURE — 97110 THERAPEUTIC EXERCISES: CPT

## 2025-07-10 NOTE — PROGRESS NOTES
"Daily Note     Today's date: 7/10/2025  Patient name: Jessica Pat  : 1965  MRN: [id number]  Referring provider: Norma Sanchez MD  Dx:   Encounter Diagnosis     ICD-10-CM    1. Left hip pain  M25.552       2. Chronic pain of left knee  M25.562     G89.29                      Subjective: Jessica reports minimal L hip pain, however \"catching\" persist with active extension.       Objective: See treatment diary below      Assessment: Jessica tolerated PT treatment well. Pt remains approprietly challenged with current exercise program, no progression made today. Did require cueing to maintain slow eccentric control with [place] hip flexion. She denied any onset of pain with performance of activity. Pt would benefit from continued PT to further address impairments and maximize functional level.       Plan: Continue per plan of care.      Daily Treatment Diary     Precautions: Standard      POC Expires Reeval for Medicare to be completed  Unit Limit Auth Expiration Date PT/OT/STVisit Limit   8/15/25 N/A 4  25 N/A                       Auth Status DATE 7/1 7/3 7/8 RE 7/10     Approved Visit # 13 14 15 16               MANUAL THERAPY         STM/MFR         Joint mobs         Manual flexibility                                    THERAPEUTIC EXERCISE HEP         PROM L hip/knee          Bike  5' L2-4 5' L2-4  5' L2-4 5' L2-4      Hip flexor stretch x         Quad stretch x 3x30s w/ SOS  3x30s  3x30s  3x30s      HS stretch           Heel Slides          Figure-4 ER stretch          Leg press  10x 90#  10x 110#  10x 130#   100#     Forward step down  2x10 B/L 8\" step  2x10 B/L 8\" step  2x10 B/L 8\" step  2x10 B/L 8\" step      Eccentric hip flexion on [place]  3x10 ea 10#  3x10 ea 10# 3x10 ea 10# 3x10 ea 10#                                   Pt education          NEUROMUSCULAR REEDUCATION           Quad sets          SLR x 2x10 B/L  2x10 B/L  2x10 B/L  2x10 B/L      Sidelying hip abd          Clamshells          Bridging  " "2x10 (5s)  2x10 (5s)  2x10 (5s)  2x10 (5s)      Prone hip ext w/ bent knee          Step up w/ glute drive  W/ contralateral hip drive and 7# med ball overhead press 2x10 8\"  W/ contralateral hip drive and 7# med ball overhead press 2x10 8\"  W/ contralateral hip drive and 7# med ball overhead press 2x10 8\"  W/ contralateral hip drive and 7# med ball overhead press 2x10 8\"      Ecc lateral step down  2x10 B/L 8\" step  2x10 B/L 8\" step  2x10 B/L 8\" step  2x10 B/L 8\" step      90-90 leg lowering  TA act w/ single leg ext 2x10 ea         Lateral walks   Retro walk 10x 15# on [place] Forward and retro walk 6x ea 15# on [place] Lateral walks 5x ea 15# on [place] Lateral walks 5x ea 15# on [place]                                             THERAPEUTIC ACTIVITY          Mini squatting  2x10 chair tap   2x10 chair tap  2x10 chair tap     Mini lunging                              GAIT TRAINING                                                  MODALITIES                               Access Code: [email or web address]  URL: [email or web address]  Date: 05/15/2025  Prepared by: Nima Murray    Exercises  - Supine Quadriceps Stretch with Strap on Table  - 1 x daily - 7 x weekly - 1 sets - 3 reps - 30 hold  - Quadriceps Stretch with Chair  - 1 x daily - 7 x weekly - 1 sets - 3 reps - 30 hold  - Active Straight Leg Raise with Quad Set  - 1 x daily - 7 x weekly - 2 sets - 10 reps - 1 hold         "

## 2025-07-14 ENCOUNTER — APPOINTMENT (OUTPATIENT)
Dept: PHYSICAL THERAPY | Facility: CLINIC | Age: 60
End: 2025-07-14
Attending: FAMILY MEDICINE
Payer: COMMERCIAL

## 2025-07-15 ENCOUNTER — OFFICE VISIT (OUTPATIENT)
Dept: PHYSICAL THERAPY | Facility: CLINIC | Age: 60
End: 2025-07-15
Attending: FAMILY MEDICINE
Payer: COMMERCIAL

## 2025-07-15 DIAGNOSIS — M25.562 CHRONIC PAIN OF LEFT KNEE: ICD-10-CM

## 2025-07-15 DIAGNOSIS — M25.552 LEFT HIP PAIN: Primary | ICD-10-CM

## 2025-07-15 DIAGNOSIS — G89.29 CHRONIC PAIN OF LEFT KNEE: ICD-10-CM

## 2025-07-15 PROCEDURE — 97112 NEUROMUSCULAR REEDUCATION: CPT

## 2025-07-15 PROCEDURE — 97110 THERAPEUTIC EXERCISES: CPT

## 2025-07-16 ENCOUNTER — APPOINTMENT (OUTPATIENT)
Dept: PHYSICAL THERAPY | Facility: CLINIC | Age: 60
End: 2025-07-16
Attending: FAMILY MEDICINE
Payer: COMMERCIAL

## 2025-07-17 ENCOUNTER — OFFICE VISIT (OUTPATIENT)
Dept: PHYSICAL THERAPY | Facility: CLINIC | Age: 60
End: 2025-07-17
Attending: FAMILY MEDICINE
Payer: COMMERCIAL

## 2025-07-17 DIAGNOSIS — M25.562 CHRONIC PAIN OF LEFT KNEE: ICD-10-CM

## 2025-07-17 DIAGNOSIS — G89.29 CHRONIC PAIN OF LEFT KNEE: ICD-10-CM

## 2025-07-17 DIAGNOSIS — M25.552 LEFT HIP PAIN: Primary | ICD-10-CM

## 2025-07-17 PROCEDURE — 97112 NEUROMUSCULAR REEDUCATION: CPT

## 2025-07-17 PROCEDURE — 97110 THERAPEUTIC EXERCISES: CPT

## 2025-07-17 NOTE — PROGRESS NOTES
"Daily Note      Today's date: 2025  Patient name: Jessica Pat  : 1965  MRN: [id number]  Referring provider: Norma Sanchez MD  Dx:   Encounter Diagnosis     ICD-10-CM    1. Left hip pain  M25.552       2. Chronic pain of left knee  M25.562     G89.29           Subjective: Pt reports that her hip felt fine leading up to yesterday. Pt states yesterday, she did nothing else of note other than swimming and then this morning still has soreness in the same spot of the left hip. Pt states that she now suspects that exercises in PT did not contribute to this hip pain.        Objective: See treatment diary below    Assessment: Pt tolerated treatment well. Pt noted one instance of snapping during SLR and intermittent snapping during hip flexion with cable column, but no provocation of left hip muscle soreness. Educated pt to self-monitor symptoms and activity changes over the weekend in order to correlate left hip symptoms with specific activity.     Plan: Continue per plan of care.         Daily Treatment Diary     Precautions: Standard      POC Expires Reeval for Medicare to be completed  Unit Limit Auth Expiration Date PT/OT/STVisit Limit   8/15/25 N/A 4  25 N/A                       Auth Status DATE  RE 7/10 7/15 7/17     Approved Visit # 15 16 17 18               MANUAL THERAPY         STM/MFR         Joint mobs         Manual flexibility                                    THERAPEUTIC EXERCISE HEP         PROM L hip/knee          Bike  5' L2-4 5' L2-4  5' L2-4  5' L2-4     Hip flexor stretch x         Quad stretch x 3x30s  3x30s  3x30s  3x30s      HS stretch           Glute/ITB stretch     3x30s  3x30s      Heel Slides          Figure-4 ER stretch          Leg press   100#       Forward step down  2x10 B/L 8\" step  2x10 B/L 8\" step  2x10 B/L 8\" step  2x10 B/L 8\" step      Eccentric hip flexion on [place]  3x10 ea 10# 3x10 ea 10# 3x10 ea 10#  2x10 ea 10#                                   Pt " "education          NEUROMUSCULAR REEDUCATION           Quad sets          SLR x 2x10 B/L  2x10 B/L  2x10 B/L  2x10 B/L      Sidelying hip abd          Clamshells          Bridging  2x10 (5s)  2x10 (5s)  2x10 (5s)  2x10 (5s)      Prone hip ext w/ bent knee          Step up w/ glute drive  W/ contralateral hip drive and 7# med ball overhead press 2x10 8\"  W/ contralateral hip drive and 7# med ball overhead press 2x10 8\"  W/ contralateral hip drive and 7# med ball overhead press 2x10 8\"  W/ contralateral hip drive and 7# med ball overhead press 2x10 8\"      Ecc lateral step down  2x10 B/L 8\" step  2x10 B/L 8\" step  2x10 B/L 8\" step  2x10 B/L 8\" step      90-90 leg lowering          Lateral walks   Lateral walks 5x ea 15# on [place] Lateral walks 5x ea 15# on [place] W/ GTB around knees 8x20 ft  W/ GTB around knees 8x20 ft                                              THERAPEUTIC ACTIVITY          Mini squatting  2x10 chair tap  2x10 chair tap 2x10 chair tap  2x10 chair tap      Mini lunging                              GAIT TRAINING                                                  MODALITIES                               Access Code: [email or web address]  URL: [email or web address]  Date: 05/15/2025  Prepared by: Nima Murray    Exercises  - Supine Quadriceps Stretch with Strap on Table  - 1 x daily - 7 x weekly - 1 sets - 3 reps - 30 hold  - Quadriceps Stretch with Chair  - 1 x daily - 7 x weekly - 1 sets - 3 reps - 30 hold  - Active Straight Leg Raise with Quad Set  - 1 x daily - 7 x weekly - 2 sets - 10 reps - 1 hold              "

## 2025-07-21 ENCOUNTER — APPOINTMENT (OUTPATIENT)
Dept: PHYSICAL THERAPY | Facility: CLINIC | Age: 60
End: 2025-07-21
Attending: FAMILY MEDICINE
Payer: COMMERCIAL

## 2025-07-22 ENCOUNTER — OFFICE VISIT (OUTPATIENT)
Dept: PHYSICAL THERAPY | Facility: CLINIC | Age: 60
End: 2025-07-22
Attending: FAMILY MEDICINE
Payer: COMMERCIAL

## 2025-07-22 DIAGNOSIS — M25.552 LEFT HIP PAIN: Primary | ICD-10-CM

## 2025-07-22 DIAGNOSIS — M25.562 CHRONIC PAIN OF LEFT KNEE: ICD-10-CM

## 2025-07-22 DIAGNOSIS — G89.29 CHRONIC PAIN OF LEFT KNEE: ICD-10-CM

## 2025-07-22 PROCEDURE — 97110 THERAPEUTIC EXERCISES: CPT

## 2025-07-22 PROCEDURE — 97112 NEUROMUSCULAR REEDUCATION: CPT

## 2025-07-22 NOTE — PROGRESS NOTES
"Daily Note      Today's date: 2025  Patient name: Jessica Pat  : 1965  MRN: [id number]  Referring provider: Norma Sanchez MD  Dx:   Encounter Diagnosis     ICD-10-CM    1. Left hip pain  M25.552       2. Chronic pain of left knee  M25.562     G89.29           Subjective: Pt reports that [place] the [place], [place] bike ~2 hours with walking breaks. Pt states that her hip and knee feel much better, but still had some pain traveling just below the kneecap.        Objective: See treatment diary below    Assessment: Pt tolerated treatment well. Pt noted no increase in pain with step ups and step downs this visit. Pt resumed with 3 sets of eccentric hip flexion with no increase in symptoms.     Plan: Continue per plan of care.         Daily Treatment Diary     Precautions: Standard      POC Expires Reeval for Medicare to be completed  Unit Limit Auth Expiration Date PT/OT/STVisit Limit   8/15/25 N/A 4  25 N/A                       Auth Status DATE  RE 7/10 7/15 7/17 7/22    Approved Visit # 15 16 17 18 19              MANUAL THERAPY         STM/MFR         Joint mobs         Manual flexibility                                    THERAPEUTIC EXERCISE HEP         PROM L hip/knee          Bike  5' L2-4 5' L2-4  5' L2-4  5' L2-4 5' L2-4     Hip flexor stretch x         Quad stretch x 3x30s  3x30s  3x30s  3x30s  3x30s     HS stretch           Glute/ITB stretch     3x30s  3x30s      Heel Slides          Figure-4 ER stretch          Leg press   100#       Forward step down  2x10 B/L 8\" step  2x10 B/L 8\" step  2x10 B/L 8\" step  2x10 B/L 8\" step  2x10 B/L 8\" step     Eccentric hip flexion on [place]  3x10 ea 10# 3x10 ea 10# 3x10 ea 10#  2x10 ea 10# 3x10 ea 10#                                   Pt education          NEUROMUSCULAR REEDUCATION           Quad sets          SLR x 2x10 B/L  2x10 B/L  2x10 B/L  2x10 B/L  2x10 B/L     Sidelying hip abd        " "  Clamshells          Bridging  2x10 (5s)  2x10 (5s)  2x10 (5s)  2x10 (5s)  2x10 (5s)     Prone hip ext w/ bent knee          Step up w/ glute drive  W/ contralateral hip drive and 7# med ball overhead press 2x10 8\"  W/ contralateral hip drive and 7# med ball overhead press 2x10 8\"  W/ contralateral hip drive and 7# med ball overhead press 2x10 8\"  W/ contralateral hip drive and 7# med ball overhead press 2x10 8\"  W/ contralateral hip drive and 7# med ball overhead press 2x10 8\"     Ecc lateral step down  2x10 B/L 8\" step  2x10 B/L 8\" step  2x10 B/L 8\" step  2x10 B/L 8\" step  2x10 B/L 8\" step     90-90 leg lowering          Lateral walks   Lateral walks 5x ea 15# on [place] Lateral walks 5x ea 15# on [place] W/ GTB around knees 8x20 ft  W/ GTB around knees 8x20 ft  W/ GTB around knees 8x20 ft                                             THERAPEUTIC ACTIVITY          Mini squatting  2x10 chair tap  2x10 chair tap 2x10 chair tap  2x10 chair tap  2x10 chair tap     Mini lunging                              GAIT TRAINING                                                  MODALITIES                               Access Code: [email or web address]  URL: [email or web address]  Date: 05/15/2025  Prepared by: Nima Murray    Exercises  - Supine Quadriceps Stretch with Strap on Table  - 1 x daily - 7 x weekly - 1 sets - 3 reps - 30 hold  - Quadriceps Stretch with Chair  - 1 x daily - 7 x weekly - 1 sets - 3 reps - 30 hold  - Active Straight Leg Raise with Quad Set  - 1 x daily - 7 x weekly - 2 sets - 10 reps - 1 hold                 "

## 2025-07-23 ENCOUNTER — APPOINTMENT (OUTPATIENT)
Dept: PHYSICAL THERAPY | Facility: CLINIC | Age: 60
End: 2025-07-23
Attending: FAMILY MEDICINE
Payer: COMMERCIAL

## 2025-07-24 ENCOUNTER — APPOINTMENT (OUTPATIENT)
Dept: PHYSICAL THERAPY | Facility: CLINIC | Age: 60
End: 2025-07-24
Attending: FAMILY MEDICINE
Payer: COMMERCIAL

## 2025-07-28 ENCOUNTER — APPOINTMENT (OUTPATIENT)
Dept: PHYSICAL THERAPY | Facility: CLINIC | Age: 60
End: 2025-07-28
Attending: FAMILY MEDICINE
Payer: COMMERCIAL

## 2025-07-29 ENCOUNTER — OFFICE VISIT (OUTPATIENT)
Dept: PHYSICAL THERAPY | Facility: CLINIC | Age: 60
End: 2025-07-29
Attending: FAMILY MEDICINE
Payer: COMMERCIAL

## 2025-07-29 DIAGNOSIS — G89.29 CHRONIC PAIN OF LEFT KNEE: ICD-10-CM

## 2025-07-29 DIAGNOSIS — M25.562 CHRONIC PAIN OF LEFT KNEE: ICD-10-CM

## 2025-07-29 DIAGNOSIS — M25.552 LEFT HIP PAIN: Primary | ICD-10-CM

## 2025-07-29 PROCEDURE — 97110 THERAPEUTIC EXERCISES: CPT

## 2025-07-29 PROCEDURE — 97112 NEUROMUSCULAR REEDUCATION: CPT

## 2025-07-30 ENCOUNTER — APPOINTMENT (OUTPATIENT)
Dept: PHYSICAL THERAPY | Facility: CLINIC | Age: 60
End: 2025-07-30
Attending: FAMILY MEDICINE
Payer: COMMERCIAL

## 2025-07-31 ENCOUNTER — OFFICE VISIT (OUTPATIENT)
Dept: PHYSICAL THERAPY | Facility: CLINIC | Age: 60
End: 2025-07-31
Attending: FAMILY MEDICINE
Payer: COMMERCIAL

## 2025-07-31 DIAGNOSIS — M25.552 LEFT HIP PAIN: Primary | ICD-10-CM

## 2025-07-31 DIAGNOSIS — M25.562 CHRONIC PAIN OF LEFT KNEE: ICD-10-CM

## 2025-07-31 DIAGNOSIS — G89.29 CHRONIC PAIN OF LEFT KNEE: ICD-10-CM

## 2025-07-31 PROCEDURE — 97112 NEUROMUSCULAR REEDUCATION: CPT

## 2025-07-31 PROCEDURE — 97110 THERAPEUTIC EXERCISES: CPT

## 2025-08-05 ENCOUNTER — OFFICE VISIT (OUTPATIENT)
Dept: PHYSICAL THERAPY | Facility: CLINIC | Age: 60
End: 2025-08-05
Attending: FAMILY MEDICINE
Payer: COMMERCIAL

## 2025-08-05 DIAGNOSIS — M25.562 CHRONIC PAIN OF LEFT KNEE: ICD-10-CM

## 2025-08-05 DIAGNOSIS — G89.29 CHRONIC PAIN OF LEFT KNEE: ICD-10-CM

## 2025-08-05 DIAGNOSIS — M25.552 LEFT HIP PAIN: Primary | ICD-10-CM

## 2025-08-05 PROCEDURE — 97112 NEUROMUSCULAR REEDUCATION: CPT

## 2025-08-05 PROCEDURE — 97110 THERAPEUTIC EXERCISES: CPT

## 2025-08-07 ENCOUNTER — OFFICE VISIT (OUTPATIENT)
Dept: PHYSICAL THERAPY | Facility: CLINIC | Age: 60
End: 2025-08-07
Attending: FAMILY MEDICINE
Payer: COMMERCIAL

## 2025-08-07 DIAGNOSIS — M25.552 LEFT HIP PAIN: Primary | ICD-10-CM

## 2025-08-07 DIAGNOSIS — G89.29 CHRONIC PAIN OF LEFT KNEE: ICD-10-CM

## 2025-08-07 DIAGNOSIS — M25.562 CHRONIC PAIN OF LEFT KNEE: ICD-10-CM

## 2025-08-07 PROCEDURE — 97112 NEUROMUSCULAR REEDUCATION: CPT

## 2025-08-07 PROCEDURE — 97110 THERAPEUTIC EXERCISES: CPT

## 2025-08-12 ENCOUNTER — OFFICE VISIT (OUTPATIENT)
Dept: PHYSICAL THERAPY | Facility: CLINIC | Age: 60
End: 2025-08-12
Attending: FAMILY MEDICINE
Payer: COMMERCIAL

## 2025-08-14 ENCOUNTER — EVALUATION (OUTPATIENT)
Dept: PHYSICAL THERAPY | Facility: CLINIC | Age: 60
End: 2025-08-14
Attending: FAMILY MEDICINE
Payer: COMMERCIAL